# Patient Record
Sex: FEMALE | Race: WHITE | NOT HISPANIC OR LATINO
[De-identification: names, ages, dates, MRNs, and addresses within clinical notes are randomized per-mention and may not be internally consistent; named-entity substitution may affect disease eponyms.]

---

## 2022-05-12 PROBLEM — Z00.00 ENCOUNTER FOR PREVENTIVE HEALTH EXAMINATION: Status: ACTIVE | Noted: 2022-05-12

## 2022-07-11 ENCOUNTER — APPOINTMENT (OUTPATIENT)
Dept: GERIATRICS | Facility: CLINIC | Age: 87
End: 2022-07-11

## 2022-07-11 VITALS
WEIGHT: 122 LBS | SYSTOLIC BLOOD PRESSURE: 110 MMHG | HEART RATE: 97 BPM | BODY MASS INDEX: 23.95 KG/M2 | TEMPERATURE: 97.6 F | HEIGHT: 60 IN | OXYGEN SATURATION: 97 % | DIASTOLIC BLOOD PRESSURE: 64 MMHG

## 2022-07-11 DIAGNOSIS — J30.2 OTHER SEASONAL ALLERGIC RHINITIS: ICD-10-CM

## 2022-07-11 DIAGNOSIS — Z82.49 FAMILY HISTORY OF ISCHEMIC HEART DISEASE AND OTHER DISEASES OF THE CIRCULATORY SYSTEM: ICD-10-CM

## 2022-07-11 DIAGNOSIS — K59.09 OTHER CONSTIPATION: ICD-10-CM

## 2022-07-11 DIAGNOSIS — Z80.1 FAMILY HISTORY OF MALIGNANT NEOPLASM OF TRACHEA, BRONCHUS AND LUNG: ICD-10-CM

## 2022-07-11 PROCEDURE — 99205 OFFICE O/P NEW HI 60 MIN: CPT

## 2022-07-11 RX ORDER — FLUTICASONE PROPIONATE 50 UG/1
50 SPRAY, METERED NASAL DAILY
Qty: 1 | Refills: 9 | Status: ACTIVE | COMMUNITY
Start: 2022-07-11

## 2022-07-11 RX ORDER — ROSUVASTATIN CALCIUM 20 MG/1
20 TABLET, FILM COATED ORAL DAILY
Qty: 90 | Refills: 3 | Status: ACTIVE | COMMUNITY
Start: 2022-07-11

## 2022-07-11 NOTE — SOCIAL HISTORY
[With spouse] : lives with spouse [Assisted living] : in assisted living [FreeTextEntry1] : [facility] in [place] Counseling: Patient advised regarding possible photosensitivity and discoloration of the teeth, skin, lips, tongue and gums.  Patient instructed to avoid sunlight, if possible.  When exposed to sunlight, patients should wear protective clothing, sunglasses, and sunscreen.  The patient was instructed to call the office immediately if the following severe adverse effects occur:  hearing changes, easy bruising/bleeding, severe headache, or vision changes.  The patient verbalized understanding of the proper use and possible adverse effects of minocycline.  All of the patient's questions and concerns were addressed.

## 2022-07-11 NOTE — ASSESSMENT
[FreeTextEntry1] : establishing care\par needs rheumatologist to help manage MTX and adminster prolia\par now no longer needing walker\par off of pain meds\par did have epidurals in past\par \par will obtain records from PCP in [place]

## 2022-07-11 NOTE — PHYSICAL EXAM
[Alert] : alert [Well Nourished] : well nourished [No Acute Distress] : in no acute distress [Well Developed] : well developed [Sclera] : the sclera and conjunctiva were normal [EOMI] : extraocular movements were intact [PERRL] : pupils were equal in size, round, and reactive to light [Normal Oral Mucosa] : normal oral mucosa [No Oral Pallor] : no oral pallor [Normal Appearance] : the appearance of the neck was normal [No Neck Mass] : no neck mass was observed [No Respiratory Distress] : no respiratory distress [No Acc Muscle Use] : no accessory muscle use [Respiration, Rhythm And Depth] : normal respiratory rhythm and effort [Auscultation Breath Sounds / Voice Sounds] : lungs were clear to auscultation bilaterally [Normal PMI] : the apical impulse was abnormal [Normal S1, S2] : normal S1 and S2 [Bowel Sounds] : normal bowel sounds [Abdomen Tenderness] : non-tender [Abdomen Soft] : soft [Cervical Lymph Nodes Enlarged Posterior Bilaterally] : posterior cervical [Supraclavicular Lymph Nodes Enlarged Bilaterally] : supraclavicular [Cervical Lymph Nodes Enlarged Anterior Bilaterally] : anterior cervical, supraclavicular [No CVA Tenderness] : no CVA  tenderness [No Spinal Tenderness] : no spinal tenderness [Normal Color / Pigmentation] : normal skin color and pigmentation [] : no rash [Skin Lesions] : no skin lesions [Normal Affect] : the affect was normal [Normal Mood] : the mood was normal

## 2022-07-11 NOTE — HISTORY OF PRESENT ILLNESS
[Completely Independent] : Completely independent. [Smoke Detector] : smoke detector [Carbon Monoxide Detector] : carbon monoxide detector [Grab Bars] : grab bars [Shower Chair] : shower chair [0] : 2) Feeling down, depressed, or hopeless: Not at all (0) [One fall no injury in past year] : Patient reported one fall in the past year without injury [PHQ-2 Negative - No further assessment needed] : PHQ-2 Negative - No further assessment needed [FreeTextEntry1] : 87 year old female presenting to establish care\par Had been living in [place] 30 years\par Moved to [place] about 9 months ago\par Children are in [place]\par [facility] living [facility]\par \par Fell in [place] and hurt her L shoulder \par She required surgery\par also was later found to have elbow fracture\par \par \par  [[id number]] : 0

## 2022-07-11 NOTE — REVIEW OF SYSTEMS
[Fever] : no fever [Chills] : no chills [Feeling Poorly] : not feeling poorly [Feeling Tired] : not feeling tired [Eyesight Problems] : no eyesight problems [Discharge From Eyes] : no purulent discharge from the eyes [Nosebleeds] : no nosebleeds [Nasal Discharge] : no nasal discharge [Chest Pain] : no chest pain [Palpitations] : no palpitations [Cough] : no cough [SOB on Exertion] : no shortness of breath during exertion [Constipation] : no constipation [Diarrhea] : no diarrhea [Dysuria] : no dysuria [Incontinence] : no incontinence [Itching] : no itching [Change In A Mole] : no change in a mole [Dizziness] : no dizziness [Fainting] : no fainting [Anxiety] : no anxiety [Depression] : no depression

## 2022-08-10 ENCOUNTER — APPOINTMENT (OUTPATIENT)
Dept: RHEUMATOLOGY | Facility: CLINIC | Age: 87
End: 2022-08-10

## 2022-08-10 VITALS
HEIGHT: 60 IN | BODY MASS INDEX: 23.95 KG/M2 | HEART RATE: 73 BPM | WEIGHT: 122 LBS | DIASTOLIC BLOOD PRESSURE: 72 MMHG | OXYGEN SATURATION: 94 % | SYSTOLIC BLOOD PRESSURE: 130 MMHG

## 2022-08-10 PROCEDURE — 99205 OFFICE O/P NEW HI 60 MIN: CPT

## 2022-08-10 NOTE — PHYSICAL EXAM
[General Appearance - Alert] : alert [General Appearance - In No Acute Distress] : in no acute distress [Sclera] : the sclera and conjunctiva were normal [PERRL With Normal Accommodation] : pupils were equal in size, round, and reactive to light [Outer Ear] : the ears and nose were normal in appearance [Neck Appearance] : the appearance of the neck was normal [Auscultation Breath Sounds / Voice Sounds] : lungs were clear to auscultation bilaterally [Heart Rate And Rhythm] : heart rate was normal and rhythm regular [Heart Sounds] : normal S1 and S2 [Edema] : there was no peripheral edema [Cervical Lymph Nodes Enlarged Posterior Bilaterally] : posterior cervical [Cervical Lymph Nodes Enlarged Anterior Bilaterally] : anterior cervical [FreeTextEntry1] : upper: no synovitis noted on exam, no deformities, joint FROM on active and passive movement: Lower FROM, no synovitis noted on exam [] : no rash [No Focal Deficits] : no focal deficits [Oriented To Time, Place, And Person] : oriented to person, place, and time

## 2022-08-10 NOTE — HISTORY OF PRESENT ILLNESS
[FreeTextEntry1] : 86yo F with  PMHx of Rheumatoid arthritis, DJD lumbar spine presented to the office to establish care.\par \par \par RA history:\par 3-4 year ago was diagnosed with rheumatoid arthritis after evaluation of diffuse body aches. started on MTX for this symptoms with some relief achieved. currently on MTX 5 tabs weekly.\par \par \par Osteoporosis: diagnosed with osteoporosis years ago base on DEXA scan findings. on Prolia last dose more that 6 monht ago\par \par Chornic low back pain: hand multiple joint injection on the back. was on treatment with Cymbalta gabapentin and tramadol. but therapy discontinue more than a year ago due to sedation related fall. pain management appointment this friday.\par \par Today reports\par -back pain - wax wane, variable intensity with response to tylenol\par -constipation\par -no significant stiffness in am\par -denied skin rashes\par -denied synovitis of the hands\par -denied dactylitis\par -

## 2022-08-11 LAB
24R-OH-CALCIDIOL SERPL-MCNC: 51.7 PG/ML
ALBUMIN SERPL ELPH-MCNC: 4.7 G/DL
ALP BLD-CCNC: 72 U/L
ALT SERPL-CCNC: 37 U/L
ANION GAP SERPL CALC-SCNC: 12 MMOL/L
AST SERPL-CCNC: 29 U/L
BASOPHILS # BLD AUTO: 0.02 K/UL
BASOPHILS NFR BLD AUTO: 0.4 %
BILIRUB SERPL-MCNC: 0.3 MG/DL
BUN SERPL-MCNC: 22 MG/DL
CALCIUM SERPL-MCNC: 10.2 MG/DL
CHLORIDE SERPL-SCNC: 105 MMOL/L
CO2 SERPL-SCNC: 26 MMOL/L
CREAT SERPL-MCNC: 0.96 MG/DL
CRP SERPL-MCNC: <3 MG/L
EGFR: 57 ML/MIN/1.73M2
EOSINOPHIL # BLD AUTO: 0.07 K/UL
EOSINOPHIL NFR BLD AUTO: 1.4 %
ERYTHROCYTE [SEDIMENTATION RATE] IN BLOOD BY WESTERGREN METHOD: 25 MM/HR
GLUCOSE SERPL-MCNC: 82 MG/DL
HCT VFR BLD CALC: 37.8 %
HGB BLD-MCNC: 11.9 G/DL
IMM GRANULOCYTES NFR BLD AUTO: 0.4 %
LYMPHOCYTES # BLD AUTO: 1.3 K/UL
LYMPHOCYTES NFR BLD AUTO: 26.2 %
MAN DIFF?: NORMAL
MCHC RBC-ENTMCNC: 29.6 PG
MCHC RBC-ENTMCNC: 31.5 GM/DL
MCV RBC AUTO: 94 FL
MONOCYTES # BLD AUTO: 0.56 K/UL
MONOCYTES NFR BLD AUTO: 11.3 %
NEUTROPHILS # BLD AUTO: 2.99 K/UL
NEUTROPHILS NFR BLD AUTO: 60.3 %
PLATELET # BLD AUTO: 170 K/UL
POTASSIUM SERPL-SCNC: 4 MMOL/L
PROT SERPL-MCNC: 6.8 G/DL
RBC # BLD: 4.02 M/UL
RBC # FLD: 16.6 %
RHEUMATOID FACT SER QL: <10 IU/ML
SODIUM SERPL-SCNC: 143 MMOL/L
WBC # FLD AUTO: 4.96 K/UL

## 2022-08-12 ENCOUNTER — APPOINTMENT (OUTPATIENT)
Dept: PAIN MANAGEMENT | Facility: CLINIC | Age: 87
End: 2022-08-12

## 2022-08-12 VITALS
SYSTOLIC BLOOD PRESSURE: 146 MMHG | DIASTOLIC BLOOD PRESSURE: 78 MMHG | BODY MASS INDEX: 23.95 KG/M2 | TEMPERATURE: 98 F | HEIGHT: 60 IN | WEIGHT: 122 LBS

## 2022-08-12 LAB
CCP AB SER IA-ACNC: <8 UNITS
RF+CCP IGG SER-IMP: NEGATIVE

## 2022-08-12 PROCEDURE — 99204 OFFICE O/P NEW MOD 45 MIN: CPT

## 2022-08-12 RX ORDER — METOPROLOL TARTRATE 25 MG/1
25 TABLET, FILM COATED ORAL TWICE DAILY
Refills: 0 | Status: COMPLETED | COMMUNITY
Start: 2022-07-11 | End: 2022-08-12

## 2022-08-12 RX ORDER — DENOSUMAB 60 MG/ML
60 INJECTION SUBCUTANEOUS
Qty: 1 | Refills: 1 | Status: ACTIVE | COMMUNITY
Start: 2022-07-11 | End: 1900-01-01

## 2022-08-12 NOTE — HISTORY OF PRESENT ILLNESS
[4] : 3. What number best describes how, during the past week, pain has interfered with your general activity? 4/10 pain [Back Pain] : back pain [___ yrs] : [unfilled] year(s) ago [Constant] : constant [5] : a minimum pain level of 5/10 [6] : a maximum pain level of 6/10 [Dull] : dull [Aching] : aching [Burning] : burning [Medications] : medications [FreeTextEntry1] : HPI\par \par  \par Ms. LALIT MCKENNA is a 87 year F with pmhx osteoporosis (was on Prolia), HTN, RA c/o chronic right sided LBP that radiates posteriorly to knee. hx of falls and memory issues  resolved since stopping Tramadol, Cymbalta, and Celebrex. Most recent fall 2021 and hospitalization for broken ribs, elbow, and broken shoulder. Numbness to the bottom of right foot in am the resolved on rising. Denies any additional weakness, numbness, bowel/bladder dysfunction.\par \par Also c/o right thumb pain that began a few months ago. Exacerbated more lately by knitting. \par \par \par Previous and current pain medications/doses/effects: Tylenol \par \par Tramadol, Cymbalta, Celebrex (stopped 6 months ago all dues to dizziness)\par \par \par Previous Pain Treatments: Physical therapy x 3 months\par \par \par Previous Pain Injections: Dr Aguirre ([place])- 2021\par \par  \par Previous Diagnostic Studies/Images:\par \par na\par \par  \par \par \par  [FreeTextEntry2] : 14 [FreeTextEntry7] : Lower back pain , radiating down right leg, also pain in right thumb  [FreeTextEntry3] : n/a [FreeTextEntry4] : PT

## 2022-08-12 NOTE — REVIEW OF SYSTEMS
[Back Pain] : back pain [Muscle Pain] : muscle pain [Radiating Pain] : radiating pain [Joint Pain] : joint pain [Numbness] : numbness [Negative] : Heme/Lymph

## 2022-08-12 NOTE — ASSESSMENT
[FreeTextEntry1] : >> Imaging and Other Studies\par \par back and leg pain likely secondary to lumbar radiculopathy and discogenic pain refractory to conservative treatments including 6 consecutive weeks of home exercises/PT, will obtain MRI LS to evaluate for pathology\par \par may consider PT vs intervention pending eval\par \par >> Therapy and Other Modalities\par \par >> Medications\par  \par Regarding opiate medication to manage pain. I had a detailed discussion with the patient regarding the risks of long-term opioid use, including the potential for medication side effects, hyperaglesia, endocrine dysfunction,  Encouraged weaning with assistance of current prescriber.\par \par acetaminophen 650mg q8h prn pain (caution <3g daily)\par \par >> Interventions\par \par may consider intervention\par \par >> Consults\par \par evaluate right thumb pain - ortho referral\par \par >> Discussion of Risks/Benefits/Alternatives\par \par 	>Regarding any scheduled procedures:\par \par I have discussed in detail with the patient that any interventional pain procedure is associated with potential risks.  The procedure may include an injection of steroids and potentially other medications (local anesthetic and normal saline) into the epidural space or surrounding tissue of the spine.  There are significant risks of this procedure which include and are not limited to infection, bleeding, worsening pain, dural puncture leading to postdural puncture headache, nerve damage, spinal cord injury, paralysis, stroke, and death.  \par \par There is a chance that the procedure does not improve their pain.  \par \par There are risks associated with the steroid being absorbed into the body systemically.  These include dysphoria, difficulty sleeping, mood swings and personality changes.  Premenopausal women may notice an irregularity in her menstrual cycle for 2-3 months following the injection.  Steroids can specifically affect patients with hypertension, diabetes, and peptic ulcers.  The procedure may cause a temporary increase in blood pressure and blood pressure, and may adversely affect a peptic ulcer.  Other, more rare complications, include avascular necrosis of joints, glaucoma and worsening of osteoporosis. \par \par I have discussed the risks of the procedure at length with the patient, and the potential benefits of pain relief.  I have offered alternatives to the procedure.  All questions were answered.  \par \par The patient expressed understanding and wishes to proceed with the procedure.\par \par 	>Regarding COVID19 Pandemic: \par \par Any planned interventional pain procedure are scheduled because further delay may cause harm or negative outcome to patient.  The goal in performing this procedure is to avoid deterioration of function, emergency room visits (which increases exposure) and reliance on opioids.  \par \par r/b/a discussed with patient, lack of evidence to conclusively determine whether pain management procedures have any positive or negative impact on the possibility of noreen the virus and/or development of any sequelae. \par \par Patient counselled regarding timing steroid based intervention 2 weeks before or after COVID-19 vaccine administration to avoid any interaction or affect on efficacy of vaccination\par \par Patient demonstrates understanding\par \par Informed patient that risks associated with the COVID-19 infection.  Informed patient steps taken to limit the risks.  We are implementing safety precautions and following protocols consistent with the CDC and state recommendations. All patients and staff will be checked for fever or signs of illness upon entry to the facility. We will limit our steroid dose to the lowest effective therapeutic dose or in some cases steroids will not be injected at all. \par \par Patient agrees to proceed\par \par >> Conclusion\par \par The above diagnosis and treatment plan is medically reasonable and necessary based on the patient encounter \par There were no barriers to communication.\par Informed patient that I would be available for any additional questions.\par Patient was instructed to call with any worsening symptoms including severe pain, new numbness/weakness, or changes in the bowel/bladder function. \par Discussed role of nsaids in pain management and all relevant risks, if patient is continuing to require after 4 weeks the patient should f/u for alternative treatment. \par Instructed patient to maintain pain diary to monitor pain level, mobility, and function.\par \par The referring provider was informed of the above diagnosis and treatment plan.\par

## 2022-08-12 NOTE — PHYSICAL EXAM
[Facet Tenderness] : facet tenderness [Spine: Flexion to ___ degrees, without pain] : spine: flexion to [unfilled] degrees, without pain [Normal] : Normal affect [de-identified] : Constitutional: Normal, well developed, no acute distress\par Eyes: Symmetric, External structures \par Oropharynx: Lips normal, symmetric, no external lesions appreciated\par Respiratory: Non-labored breathing, no audible wheezes\par Cardiac: Pulse palpated, no tachycardia\par Vascular: No cyanosis appreciated, no edema in bilateral lower extremities\par GI: Nondistended, no jaundice appreciated\par Neurovascular: CN2-12 grossly intact, Alert and oriented\par MSK: Normal muscle bulk, 5/5 Motor strength B/L in LE\par \par

## 2022-08-12 NOTE — REASON FOR VISIT
[Initial Visit] : an initial pain management visit [FreeTextEntry2] : Referred by Dr. Gagnon for back pain

## 2022-08-20 ENCOUNTER — RESULT REVIEW (OUTPATIENT)
Age: 87
End: 2022-08-20

## 2022-09-02 ENCOUNTER — APPOINTMENT (OUTPATIENT)
Dept: RHEUMATOLOGY | Facility: CLINIC | Age: 87
End: 2022-09-02

## 2022-09-02 VITALS
DIASTOLIC BLOOD PRESSURE: 60 MMHG | HEIGHT: 60 IN | OXYGEN SATURATION: 98 % | BODY MASS INDEX: 24.15 KG/M2 | SYSTOLIC BLOOD PRESSURE: 138 MMHG | HEART RATE: 62 BPM | WEIGHT: 123 LBS

## 2022-09-02 PROCEDURE — 99214 OFFICE O/P EST MOD 30 MIN: CPT

## 2022-09-02 NOTE — END OF VISIT
[Time Spent: ___ minutes] : I have spent [unfilled] minutes of time on the encounter.
Patient tolerated procedure well.

## 2022-09-02 NOTE — HISTORY OF PRESENT ILLNESS
[___ Month(s) Ago] : [unfilled] month(s) ago [FreeTextEntry1] : RA: no evidence of active inflammatory arthritis. back pain unlikely to be a manifestation of RA\par Osteoporosis: last lab results, osteopenia. prolia approved to receive it in the office\par pain evaluation completed. MRI lumbar spine with DJD

## 2022-09-15 ENCOUNTER — LABORATORY RESULT (OUTPATIENT)
Age: 87
End: 2022-09-15

## 2022-09-21 ENCOUNTER — APPOINTMENT (OUTPATIENT)
Dept: PAIN MANAGEMENT | Facility: CLINIC | Age: 87
End: 2022-09-21

## 2022-09-21 VITALS
BODY MASS INDEX: 24.15 KG/M2 | DIASTOLIC BLOOD PRESSURE: 78 MMHG | HEIGHT: 60 IN | SYSTOLIC BLOOD PRESSURE: 149 MMHG | TEMPERATURE: 98 F | WEIGHT: 123 LBS

## 2022-09-21 PROCEDURE — 99214 OFFICE O/P EST MOD 30 MIN: CPT

## 2022-09-30 ENCOUNTER — APPOINTMENT (OUTPATIENT)
Dept: PAIN MANAGEMENT | Facility: CLINIC | Age: 87
End: 2022-09-30

## 2022-09-30 VITALS
BODY MASS INDEX: 24.54 KG/M2 | RESPIRATION RATE: 16 BRPM | TEMPERATURE: 98 F | HEIGHT: 60 IN | SYSTOLIC BLOOD PRESSURE: 137 MMHG | OXYGEN SATURATION: 100 % | WEIGHT: 125 LBS | DIASTOLIC BLOOD PRESSURE: 80 MMHG | HEART RATE: 57 BPM

## 2022-09-30 PROCEDURE — 64494 INJ PARAVERT F JNT L/S 2 LEV: CPT | Mod: 50

## 2022-09-30 PROCEDURE — 64493 INJ PARAVERT F JNT L/S 1 LEV: CPT | Mod: 50

## 2022-09-30 RX ADMIN — TRIAMCINOLONE ACETONIDE MG/ML: 10 INJECTION, SUSPENSION INTRA-ARTICULAR; INTRALESIONAL at 00:00

## 2022-09-30 RX ADMIN — BUPIVACAINE HYDROCHLORIDE %: 2.5 INJECTION, SOLUTION INFILTRATION; PERINEURAL at 00:00

## 2022-09-30 NOTE — HISTORY OF PRESENT ILLNESS
[3] : 3. What number best describes how, during the past week, pain has interfered with your general activity? 3/10 pain [Back Pain] : back pain [___ yrs] : [unfilled] year(s) ago [Constant] : constant [5] : a minimum pain level of 5/10 [6] : a maximum pain level of 6/10 [Dull] : dull [Aching] : aching [Burning] : burning [Medications] : medications [FreeTextEntry1] : Interval Note:\par \par Since last visit the pain is not improved.  Patient reports that her pain is worse with transition.  Also complains of pain over right mid back and pain over bilateral hands.  Seeing Dr. Pandey (rheum). Denies any additional weakness, numbness, bowel/bladder dysfunction.  denies any worsening numbness, weakness, bowel/bladder dysfunction. \par \par \par HPI\par \par  \par Ms. LALIT MCKENNA is a 87 year F with pmhx osteoporosis (was on Prolia), HTN, RA c/o chronic right sided LBP that radiates posteriorly to knee. hx of falls and memory issues  resolved since stopping Tramadol, Cymbalta, and Celebrex. Most recent fall 2021 and hospitalization for broken ribs, elbow, and broken shoulder. Numbness to the bottom of right foot in am the resolved on rising. Denies any additional weakness, numbness, bowel/bladder dysfunction.\par \par Also c/o right thumb pain that began a few months ago. Exacerbated more lately by knitting. \par \par \par Previous and current pain medications/doses/effects: Tylenol \par \par Tramadol, Cymbalta, Celebrex (stopped 6 months ago all dues to dizziness)\par \par \par Previous Pain Treatments: Physical therapy x 3 months\par \par \par Previous Pain Injections: Dr Aguirre ([place])- 2021\par \par  \par Previous Diagnostic Studies/Images:\par \par MRI  LS 8/22\par \par There is grade 1 anterolisthesis of L5-S1. There is straightening of the normal lumbar lordosis.\par There is S-shaped curvature of the lumbar spine. The vertebral body heights are maintained. The\par vertebral body marrow signal is appropriate for the patient's stated age.\par \par  The conus medullaris ends at L1.\par \par  L1/2 level: Small broad-based posterior disc bulge and mild facet arthropathy without central canal\par and neural foraminal narrowing.\par \par  L2/3  level: Broad-based posterior disc bulge and moderate bilateral facet arthropathy result in\par mild bilateral neural foraminal narrowing and moderate central canal narrowing. There is impingement\par of the bilateral descending C2slopr roots within the right and left lateral recesses.\par \par  L3/4 level: Broad-based posterior disc bulge and moderate bilateral facet arthropathy result in\par severe central canal narrowing, mild left neural foraminal narrowing and moderate right neural charan\par inal narrowing. There is impingement of the descending right L4 nerve root within the right lateral\par recess.\par \par  L4/5 level: Broad-based posterior disc bulge and moderate bilateral facet arthropathy (right\par greater than left) result in mild central canal narrowing, moderate to severe right neural foraminal\par narrowing and moderate left neural foraminal narrowing. There is impingement of the descending right\par L5 nerve roots within the lateral recess.\par \par  L5/S1 level: Grade 1 anterolisthesis. Broad-based posterior disc bulge and severe bilateral facet\par arthropathy (right greater than left) result in moderate central canal narrowing, moderate right\par neural foraminal narrowing and mild left neural foraminal narrowing. 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 There is also moderate central canal narrowing\par at L5-S1 in addition to moderate right neural foraminal narrowing and mild left neural foraminal\par narrowing. Additional varying degrees of central canal and neural foraminal narrowing, as above.\par \par \par \par  \par \par \par  [FreeTextEntry2] : 9 [FreeTextEntry7] : Lower back pain , radiating down right leg, also pain in right thumb  [FreeTextEntry3] : n/a [FreeTextEntry4] : PT

## 2022-09-30 NOTE — ASSESSMENT
[FreeTextEntry1] : >> Imaging and Other Studies\par \par I personally reviewed the relevant imaging.  Discussed and explained to patient the likely source of pathology and pain.  Questions answered. MRI \par \par >> Therapy and Other Modalities\par \par >> Medications\par  \par acetaminophen 650mg q8h prn pain (caution <3g daily)\par \par >> Interventions\par \par Significant component of axial back pain secondary to lumbar spondylosis and facet arthropathy demonstrated on MRI LS.  Pain refractory to conservative treatments.  Will schedule BILATERAL L4-sacral ala diagnostic medial branch block (2 joints, 3 nerves on each side) r/b/a discussed\par \par May consider radiofreqency ablation\par \par >> Consults\par \par evaluate right thumb pain - ortho referral\par \par >> Discussion of Risks/Benefits/Alternatives\par \par 	>Regarding any scheduled procedures:\par \par I have discussed in detail with the patient that any interventional pain procedure is associated with potential risks.  The procedure may include an injection of steroids and potentially other medications (local anesthetic and normal saline) into the epidural space or surrounding tissue of the spine.  There are significant risks of this procedure which include and are not limited to infection, bleeding, worsening pain, dural puncture leading to postdural puncture headache, nerve damage, spinal cord injury, paralysis, stroke, and death.  \par \par There is a chance that the procedure does not improve their pain.  \par \par There are risks associated with the steroid being absorbed into the body systemically.  These include dysphoria, difficulty sleeping, mood swings and personality changes.  Premenopausal women may notice an irregularity in her menstrual cycle for 2-3 months following the injection.  Steroids can specifically affect patients with hypertension, diabetes, and peptic ulcers.  The procedure may cause a temporary increase in blood pressure and blood pressure, and may adversely affect a peptic ulcer.  Other, more rare complications, include avascular necrosis of joints, glaucoma and worsening of osteoporosis. \par \par I have discussed the risks of the procedure at length with the patient, and the potential benefits of pain relief.  I have offered alternatives to the procedure.  All questions were answered.  \par \par The patient expressed understanding and wishes to proceed with the procedure.\par \par 	>Regarding COVID19 Pandemic: \par \par Any planned interventional pain procedure are scheduled because further delay may cause harm or negative outcome to patient.  The goal in performing this procedure is to avoid deterioration of function, emergency room visits (which increases exposure) and reliance on opioids.  \par \par r/b/a discussed with patient, lack of evidence to conclusively determine whether pain management procedures have any positive or negative impact on the possibility of noreen the virus and/or development of any sequelae. \par \par Patient counselled regarding timing steroid based intervention 2 weeks before or after COVID-19 vaccine administration to avoid any interaction or affect on efficacy of vaccination\par \par Patient demonstrates understanding\par \par Informed patient that risks associated with the COVID-19 infection.  Informed patient steps taken to limit the risks.  We are implementing safety precautions and following protocols consistent with the CDC and state recommendations. All patients and staff will be checked for fever or signs of illness upon entry to the facility. We will limit our steroid dose to the lowest effective therapeutic dose or in some cases steroids will not be injected at all. \par \par Patient agrees to proceed\par \par >> Conclusion\par \par The above diagnosis and treatment plan is medically reasonable and necessary based on the patient encounter \par There were no barriers to communication.\par Informed patient that I would be available for any additional questions.\par Patient was instructed to call with any worsening symptoms including severe pain, new numbness/weakness, or changes in the bowel/bladder function. \par Discussed role of nsaids in pain management and all relevant risks, if patient is continuing to require after 4 weeks the patient should f/u for alternative treatment. \par Instructed patient to maintain pain diary to monitor pain level, mobility, and function.\par \par

## 2022-10-04 ENCOUNTER — APPOINTMENT (OUTPATIENT)
Dept: PAIN MANAGEMENT | Facility: CLINIC | Age: 87
End: 2022-10-04

## 2022-10-04 PROCEDURE — 99214 OFFICE O/P EST MOD 30 MIN: CPT | Mod: 95

## 2022-10-06 NOTE — HISTORY OF PRESENT ILLNESS
[Home] : at home, [unfilled] , at the time of the visit. [Medical Office: ([facility])___] : at the medical office located in  [Verbal consent obtained from patient] : the patient, [unfilled] [Back Pain] : back pain [___ yrs] : [unfilled] year(s) ago [Constant] : constant [5] : a minimum pain level of 5/10 [6] : a maximum pain level of 6/10 [Dull] : dull [Aching] : aching [Burning] : burning [Medications] : medications [FreeTextEntry1] : Interval Note:\par sp  BILATERAL L4-sacral ala diagnostic medial branch block (2 joints, 3 nerves on each side) 9/30/22 with sustained improvement in axial back pain 80%.  Patient reports that "ordinarily, I would have taken a tylenol or two by now.\par   denies any worsening numbness, weakness, bowel/bladder dysfunction. \par \par \par HPI\par \par  \par Ms. LALIT MCKENNA is a 88 year F with pmhx osteoporosis (was on Prolia), HTN, RA c/o chronic right sided LBP that radiates posteriorly to knee. hx of falls and memory issues  resolved since stopping Tramadol, Cymbalta, and Celebrex. Most recent fall 2021 and hospitalization for broken ribs, elbow, and broken shoulder. Numbness to the bottom of right foot in am the resolved on rising. Denies any additional weakness, numbness, bowel/bladder dysfunction.\par \par Also c/o right thumb pain that began a few months ago. Exacerbated more lately by knitting. \par \par \par Previous and current pain medications/doses/effects: Tylenol \par \par Tramadol, Cymbalta, Celebrex (stopped 6 months ago all dues to dizziness)\par \par \par Previous Pain Treatments: Physical therapy x 3 months\par \par \par Previous Pain Injections: \par BILATERAL L4-sacral ala diagnostic medial branch block (2 joints, 3 nerves on each side) 9/30/22\par Dr Aguirre ([place])- 2021\par  \par Previous Diagnostic Studies/Images:\par \par MRI  LS 8/22\par \par There is grade 1 anterolisthesis of L5-S1. There is straightening of the normal lumbar lordosis.\par There is S-shaped curvature of the lumbar spine. The vertebral body heights are maintained. The\par vertebral body marrow signal is appropriate for the patient's stated age.\par \par  The conus medullaris ends at L1.\par \par  L1/2 level: Small broad-based posterior disc bulge and mild facet arthropathy without central canal\par and neural foraminal narrowing.\par \par  L2/3  level: Broad-based posterior disc bulge and moderate bilateral facet arthropathy result in\par mild bilateral neural foraminal narrowing and moderate central canal narrowing. There is impingement\par of the bilateral descending [id number] roots within the right and left lateral recesses.\par \par  L3/4 level: Broad-based posterior disc bulge and moderate bilateral facet arthropathy result in\par severe central canal narrowing, mild left neural foraminal narrowing and moderate right neural charan\par inal narrowing. There is impingement of the descending right L4 nerve root within the right lateral\par recess.\par \par  L4/5 level: Broad-based posterior disc bulge and moderate bilateral facet arthropathy (right\par greater than left) result in mild central canal narrowing, moderate to severe right neural foraminal\par narrowing and moderate left neural foraminal narrowing. There is impingement of the descending right\par L5 nerve roots within the lateral recess.\par \par  L5/S1 level: Grade 1 anterolisthesis. Broad-based posterior disc bulge and severe bilateral facet\par arthropathy (right greater than left) result in moderate central canal narrowing, moderate right\par neural foraminal narrowing and mild left neural foraminal narrowing. 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 There is also moderate central canal narrowing\par at L5-S1 in addition to moderate right neural foraminal narrowing and mild left neural foraminal\par narrowing. Additional varying degrees of central canal and neural foraminal narrowing, as above.\par \par \par \par  \par \par \par  [FreeTextEntry7] : Lower back pain , radiating down right leg, also pain in right thumb  [FreeTextEntry3] : n/a [FreeTextEntry4] : PT

## 2022-10-06 NOTE — ASSESSMENT
[FreeTextEntry1] : >> Imaging and Other Studies\par \par I personally reviewed the relevant imaging.  Discussed and explained to patient the likely source of pathology and pain.  Questions answered. MRI \par \par >> Therapy and Other Modalities\par \par >> Medications\par  \par acetaminophen 650mg q8h prn pain (caution <3g daily)\par \par >> Interventions\par \par Significant component of axial back pain secondary to lumbar spondylosis and facet arthropathy demonstrated on MRI LS.  Pain refractory to conservative treatments. sp BILATERAL L4-sacral ala diagnostic medial branch block (2 joints, 3 nerves on each side) with 80% sustained relief of axial back pain\par \par May consider radiofreqency ablation\par \par >> Consults\par \par evaluate right thumb pain - ortho referral\par \par >> Discussion of Risks/Benefits/Alternatives\par \par 	>Regarding any scheduled procedures:\par \par I have discussed in detail with the patient that any interventional pain procedure is associated with potential risks.  The procedure may include an injection of steroids and potentially other medications (local anesthetic and normal saline) into the epidural space or surrounding tissue of the spine.  There are significant risks of this procedure which include and are not limited to infection, bleeding, worsening pain, dural puncture leading to postdural puncture headache, nerve damage, spinal cord injury, paralysis, stroke, and death.  \par \par There is a chance that the procedure does not improve their pain.  \par \par There are risks associated with the steroid being absorbed into the body systemically.  These include dysphoria, difficulty sleeping, mood swings and personality changes.  Premenopausal women may notice an irregularity in her menstrual cycle for 2-3 months following the injection.  Steroids can specifically affect patients with hypertension, diabetes, and peptic ulcers.  The procedure may cause a temporary increase in blood pressure and blood pressure, and may adversely affect a peptic ulcer.  Other, more rare complications, include avascular necrosis of joints, glaucoma and worsening of osteoporosis. \par \par I have discussed the risks of the procedure at length with the patient, and the potential benefits of pain relief.  I have offered alternatives to the procedure.  All questions were answered.  \par \par The patient expressed understanding and wishes to proceed with the procedure.\par \par 	>Regarding COVID19 Pandemic: \par \par Any planned interventional pain procedure are scheduled because further delay may cause harm or negative outcome to patient.  The goal in performing this procedure is to avoid deterioration of function, emergency room visits (which increases exposure) and reliance on opioids.  \par \par r/b/a discussed with patient, lack of evidence to conclusively determine whether pain management procedures have any positive or negative impact on the possibility of noreen the virus and/or development of any sequelae. \par \par Patient counselled regarding timing steroid based intervention 2 weeks before or after COVID-19 vaccine administration to avoid any interaction or affect on efficacy of vaccination\par \par Patient demonstrates understanding\par \par Informed patient that risks associated with the COVID-19 infection.  Informed patient steps taken to limit the risks.  We are implementing safety precautions and following protocols consistent with the CDC and state recommendations. All patients and staff will be checked for fever or signs of illness upon entry to the facility. We will limit our steroid dose to the lowest effective therapeutic dose or in some cases steroids will not be injected at all. \par \par Patient agrees to proceed\par \par >> Conclusion\par \par The above diagnosis and treatment plan is medically reasonable and necessary based on the patient encounter \par There were no barriers to communication.\par Informed patient that I would be available for any additional questions.\par Patient was instructed to call with any worsening symptoms including severe pain, new numbness/weakness, or changes in the bowel/bladder function. \par Discussed role of nsaids in pain management and all relevant risks, if patient is continuing to require after 4 weeks the patient should f/u for alternative treatment. \par Instructed patient to maintain pain diary to monitor pain level, mobility, and function.\par \par I explained to patient benefits and limitation of TeleMedicine visits\par \par Patient understands that limitations include inability to perform comprehensive physical exam, which may lead to potential diagnostic inconsistencies.  \par \par Any scheduled procedures are based on history, imaging and limited physical exam performed on TeleHealth visit.  If necessary, additional focal physical exam will be performed on date of procedure\par \par Patient understands that diagnosis and treatment may be limited by these inconsistencies and patient agrees to proceed with care plan\par \par \par

## 2022-10-19 ENCOUNTER — APPOINTMENT (OUTPATIENT)
Dept: PAIN MANAGEMENT | Facility: CLINIC | Age: 87
End: 2022-10-19

## 2022-10-19 VITALS
DIASTOLIC BLOOD PRESSURE: 78 MMHG | HEIGHT: 60 IN | OXYGEN SATURATION: 99 % | HEART RATE: 64 BPM | BODY MASS INDEX: 24.54 KG/M2 | WEIGHT: 125 LBS | SYSTOLIC BLOOD PRESSURE: 137 MMHG | RESPIRATION RATE: 16 BRPM

## 2022-10-19 PROCEDURE — 64493 INJ PARAVERT F JNT L/S 1 LEV: CPT | Mod: 50

## 2022-10-19 PROCEDURE — 64494 INJ PARAVERT F JNT L/S 2 LEV: CPT | Mod: 50

## 2022-10-19 RX ADMIN — TRIAMCINOLONE ACETONIDE MG/ML: 10 INJECTION, SUSPENSION INTRA-ARTICULAR; INTRALESIONAL at 00:00

## 2022-10-19 RX ADMIN — Medication %: at 00:00

## 2022-10-19 RX ADMIN — BUPIVACAINE HYDROCHLORIDE 0 %: 2.5 INJECTION, SOLUTION EPIDURAL; INFILTRATION; INTRACAUDAL at 00:00

## 2022-10-20 ENCOUNTER — APPOINTMENT (OUTPATIENT)
Dept: PAIN MANAGEMENT | Facility: CLINIC | Age: 87
End: 2022-10-20

## 2022-10-20 VITALS
DIASTOLIC BLOOD PRESSURE: 77 MMHG | WEIGHT: 125 LBS | BODY MASS INDEX: 24.54 KG/M2 | SYSTOLIC BLOOD PRESSURE: 142 MMHG | HEIGHT: 60 IN | TEMPERATURE: 98 F

## 2022-10-20 PROCEDURE — 99214 OFFICE O/P EST MOD 30 MIN: CPT

## 2022-10-20 NOTE — ASSESSMENT
[FreeTextEntry1] : >> Imaging and Other Studies\par \par I personally reviewed the relevant imaging.  Discussed and explained to patient the likely source of pathology and pain.  Questions answered. MRI \par \par >> Therapy and Other Modalities\par \par start PT - referral provided\par \par >> Medications\par  \par acetaminophen 650mg q8h prn pain (caution <3g daily)\par \par >> Interventions\par \par Significant component of axial back pain secondary to lumbar spondylosis and facet arthropathy demonstrated on MRI LS.  Pain refractory to conservative treatments. sp BILATERAL L4-sacral ala diagnostic medial branch block (2 joints, 3 nerves on each side) with 80% sustained relief of axial back pain\par \par sp repeat BILATERAL L4-sacral ala diagnostic medial branch block (2 joints, 3 nerves on each side) with 100% sustained improvement\par \par May consider radiofreqency ablation\par \par >> Consults\par \par evaluate right thumb pain - ortho referral\par \par >> Discussion of Risks/Benefits/Alternatives\par \par 	>Regarding any scheduled procedures:\par \par I have discussed in detail with the patient that any interventional pain procedure is associated with potential risks.  The procedure may include an injection of steroids and potentially other medications (local anesthetic and normal saline) into the epidural space or surrounding tissue of the spine.  There are significant risks of this procedure which include and are not limited to infection, bleeding, worsening pain, dural puncture leading to postdural puncture headache, nerve damage, spinal cord injury, paralysis, stroke, and death.  \par \par There is a chance that the procedure does not improve their pain.  \par \par There are risks associated with the steroid being absorbed into the body systemically.  These include dysphoria, difficulty sleeping, mood swings and personality changes.  Premenopausal women may notice an irregularity in her menstrual cycle for 2-3 months following the injection.  Steroids can specifically affect patients with hypertension, diabetes, and peptic ulcers.  The procedure may cause a temporary increase in blood pressure and blood pressure, and may adversely affect a peptic ulcer.  Other, more rare complications, include avascular necrosis of joints, glaucoma and worsening of osteoporosis. \par \par I have discussed the risks of the procedure at length with the patient, and the potential benefits of pain relief.  I have offered alternatives to the procedure.  All questions were answered.  \par \par The patient expressed understanding and wishes to proceed with the procedure.\par \par 	>Regarding COVID19 Pandemic: \par \par Any planned interventional pain procedure are scheduled because further delay may cause harm or negative outcome to patient.  The goal in performing this procedure is to avoid deterioration of function, emergency room visits (which increases exposure) and reliance on opioids.  \par \par r/b/a discussed with patient, lack of evidence to conclusively determine whether pain management procedures have any positive or negative impact on the possibility of noreen the virus and/or development of any sequelae. \par \par Patient counselled regarding timing steroid based intervention 2 weeks before or after COVID-19 vaccine administration to avoid any interaction or affect on efficacy of vaccination\par \par Patient demonstrates understanding\par \par Informed patient that risks associated with the COVID-19 infection.  Informed patient steps taken to limit the risks.  We are implementing safety precautions and following protocols consistent with the CDC and state recommendations. All patients and staff will be checked for fever or signs of illness upon entry to the facility. We will limit our steroid dose to the lowest effective therapeutic dose or in some cases steroids will not be injected at all. \par \par Patient agrees to proceed\par \par >> Conclusion\par \par The above diagnosis and treatment plan is medically reasonable and necessary based on the patient encounter \par There were no barriers to communication.\par Informed patient that I would be available for any additional questions.\par Patient was instructed to call with any worsening symptoms including severe pain, new numbness/weakness, or changes in the bowel/bladder function. \par Discussed role of nsaids in pain management and all relevant risks, if patient is continuing to require after 4 weeks the patient should f/u for alternative treatment. \par Instructed patient to maintain pain diary to monitor pain level, mobility, and function.\par \par I explained to patient benefits and limitation of TeleMedicine visits\par \par Patient understands that limitations include inability to perform comprehensive physical exam, which may lead to potential diagnostic inconsistencies.  \par \par Any scheduled procedures are based on history, imaging and limited physical exam performed on TeleHealth visit.  If necessary, additional focal physical exam will be performed on date of procedure\par \par Patient understands that diagnosis and treatment may be limited by these inconsistencies and patient agrees to proceed with care plan\par \par \par

## 2022-10-20 NOTE — HISTORY OF PRESENT ILLNESS
[0 (No Pain)] : 1. What number best describes your pain on average in the past week? 0/10 pain [0 (Does not interfere)] : 3. What number best describes how, during the past week, pain has interfered with your general activity? 0/10 pain [Back Pain] : back pain [___ yrs] : [unfilled] year(s) ago [Constant] : constant [5] : a minimum pain level of 5/10 [6] : a maximum pain level of 6/10 [Dull] : dull [Aching] : aching [Burning] : burning [Medications] : medications [FreeTextEntry1] : Interval Note:\par \par sp  BILATERAL L4-sacral ala diagnostic medial branch block (2 joints, 3 nerves on each side) 9/30/22 with sustained improvement in axial back pain 100% improvement.  Patient is doing well and very comfortable. \par   denies any worsening numbness, weakness, bowel/bladder dysfunction. \par \par \par HPI\par \par  \par Ms. LALIT MCKENNA is a 88 year F with pmhx osteoporosis (was on Prolia), HTN, RA c/o chronic right sided LBP that radiates posteriorly to knee. hx of falls and memory issues  resolved since stopping Tramadol, Cymbalta, and Celebrex. Most recent fall 2021 and hospitalization for broken ribs, elbow, and broken shoulder. Numbness to the bottom of right foot in am the resolved on rising. Denies any additional weakness, numbness, bowel/bladder dysfunction.\par \par Also c/o right thumb pain that began a few months ago. Exacerbated more lately by knitting. \par \par \par Previous and current pain medications/doses/effects: Tylenol \par \par Tramadol, Cymbalta, Celebrex (stopped 6 months ago all dues to dizziness)\par \par \par Previous Pain Treatments: Physical therapy x 3 months\par \par \par Previous Pain Injections: \par \par BILATERAL L4-sacral ala diagnostic medial branch block (2 joints, 3 nerves on each side) 10/19/22\par BILATERAL L4-sacral ala diagnostic medial branch block (2 joints, 3 nerves on each side) 9/30/22\par Dr Aguirre ([place])- 2021\par  \par Previous Diagnostic Studies/Images:\par \par MRI  LS 8/22\par \par There is grade 1 anterolisthesis of L5-S1. There is straightening of the normal lumbar lordosis.\par There is S-shaped curvature of the lumbar spine. The vertebral body heights are maintained. The\par vertebral body marrow signal is appropriate for the patient's stated age.\par \par  The conus medullaris ends at L1.\par \par  L1/2 level: Small broad-based posterior disc bulge and mild facet arthropathy without central canal\par and neural foraminal narrowing.\par \par  L2/3  level: Broad-based posterior disc bulge and moderate bilateral facet arthropathy result in\par mild bilateral neural foraminal narrowing and moderate central canal narrowing. There is impingement\par of the bilateral descending [id number] roots within the right and left lateral recesses.\par \par  L3/4 level: Broad-based posterior disc bulge and moderate bilateral facet arthropathy result in\par severe central canal narrowing, mild left neural foraminal narrowing and moderate right neural charan\par inal narrowing. There is impingement of the descending right L4 nerve root within the right lateral\par recess.\par \par  L4/5 level: Broad-based posterior disc bulge and moderate bilateral facet arthropathy (right\par greater than left) result in mild central canal narrowing, moderate to severe right neural foraminal\par narrowing and moderate left neural foraminal narrowing. There is impingement of the descending right\par L5 nerve roots within the lateral recess.\par \par  L5/S1 level: Grade 1 anterolisthesis. Broad-based posterior disc bulge and severe bilateral facet\par arthropathy (right greater than left) result in moderate central canal narrowing, moderate right\par neural foraminal narrowing and mild left neural foraminal narrowing. 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 There is also moderate central canal narrowing\par at L5-S1 in addition to moderate right neural foraminal narrowing and mild left neural foraminal\par narrowing. Additional varying degrees of central canal and neural foraminal narrowing, as above.\par \par \par \par  \par \par \par  [FreeTextEntry2] : 0 [FreeTextEntry7] : Lower back pain , radiating down right leg, also pain in right thumb  [FreeTextEntry3] : n/a [FreeTextEntry4] : PT

## 2022-10-21 ENCOUNTER — RX RENEWAL (OUTPATIENT)
Age: 87
End: 2022-10-21

## 2022-11-15 ENCOUNTER — LABORATORY RESULT (OUTPATIENT)
Age: 87
End: 2022-11-15

## 2022-11-18 ENCOUNTER — APPOINTMENT (OUTPATIENT)
Dept: PAIN MANAGEMENT | Facility: CLINIC | Age: 87
End: 2022-11-18

## 2022-11-18 VITALS
WEIGHT: 125 LBS | BODY MASS INDEX: 24.54 KG/M2 | HEIGHT: 60 IN | DIASTOLIC BLOOD PRESSURE: 88 MMHG | TEMPERATURE: 98 F | SYSTOLIC BLOOD PRESSURE: 157 MMHG

## 2022-11-18 PROCEDURE — 99214 OFFICE O/P EST MOD 30 MIN: CPT

## 2022-11-18 NOTE — HISTORY OF PRESENT ILLNESS
[3] : 3. What number best describes how, during the past week, pain has interfered with your general activity? 3/10 pain [Back Pain] : back pain [___ yrs] : [unfilled] year(s) ago [Constant] : constant [5] : a minimum pain level of 5/10 [6] : a maximum pain level of 6/10 [Dull] : dull [Aching] : aching [Burning] : burning [Medications] : medications [FreeTextEntry1] : Interval Note:\par \par sp  BILATERAL L4-sacral ala diagnostic medial branch block (2 joints, 3 nerves on each side) with sustained improvement in axial back pain 100% improvement.  \par \par She complains today of neck pain radiating to right shoulder as well as right thumb pain and left forearm pain, reports that it affects ability to perform adls and grab objects at times. \par   denies any worsening numbness, weakness, bowel/bladder dysfunction. \par \par \par HPI\par \par  \par Ms. LALIT MCKENNA is a 88 year F with pmhx osteoporosis (was on Prolia), HTN, RA c/o chronic right sided LBP that radiates posteriorly to knee. hx of falls and memory issues  resolved since stopping Tramadol, Cymbalta, and Celebrex. Most recent fall 2021 and hospitalization for broken ribs, elbow, and broken shoulder. Numbness to the bottom of right foot in am the resolved on rising. Denies any additional weakness, numbness, bowel/bladder dysfunction.\par \par Also c/o right thumb pain that began a few months ago. Exacerbated more lately by knitting. \par \par \par Previous and current pain medications/doses/effects: Tylenol \par \par Tramadol, Cymbalta, Celebrex (stopped 6 months ago all dues to dizziness)\par \par \par Previous Pain Treatments: Physical therapy x 3 months\par \par \par Previous Pain Injections: \par \par BILATERAL L4-sacral ala diagnostic medial branch block (2 joints, 3 nerves on each side) 10/19/22\par BILATERAL L4-sacral ala diagnostic medial branch block (2 joints, 3 nerves on each side) 9/30/22\par Dr Aguirre ([place])- 2021\par  \par Previous Diagnostic Studies/Images:\par \par MRI  LS 8/22\par \par There is grade 1 anterolisthesis of L5-S1. There is straightening of the normal lumbar lordosis.\par There is S-shaped curvature of the lumbar spine. The vertebral body heights are maintained. The\par vertebral body marrow signal is appropriate for the patient's stated age.\par \par  The conus medullaris ends at L1.\par \par  L1/2 level: Small broad-based posterior disc bulge and mild facet arthropathy without central canal\par and neural foraminal narrowing.\par \par  L2/3  level: Broad-based posterior disc bulge and moderate bilateral facet arthropathy result in\par mild bilateral neural foraminal narrowing and moderate central canal narrowing. There is impingement\par of the bilateral descending [id number] roots within the right and left lateral recesses.\par \par  L3/4 level: Broad-based posterior disc bulge and moderate bilateral facet arthropathy result in\par severe central canal narrowing, mild left neural foraminal narrowing and moderate right neural charan\par inal narrowing. There is impingement of the descending right L4 nerve root within the right lateral\par recess.\par \par  L4/5 level: Broad-based posterior disc bulge and moderate bilateral facet arthropathy (right\par greater than left) result in mild central canal narrowing, moderate to severe right neural foraminal\par narrowing and moderate left neural foraminal narrowing. There is impingement of the descending right\par L5 nerve roots within the lateral recess.\par \par  L5/S1 level: Grade 1 anterolisthesis. Broad-based posterior disc bulge and severe bilateral facet\par arthropathy (right greater than left) result in moderate central canal narrowing, moderate right\par neural foraminal narrowing and mild left neural foraminal narrowing. 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 There is also moderate central canal narrowing\par at L5-S1 in addition to moderate right neural foraminal narrowing and mild left neural foraminal\par narrowing. Additional varying degrees of central canal and neural foraminal narrowing, as above.\par \par \par \par  \par \par \par  [FreeTextEntry2] : 9 [FreeTextEntry7] : Lower back pain , radiating down right leg, also pain in right thumb  [FreeTextEntry3] : n/a [FreeTextEntry4] : PT

## 2022-11-18 NOTE — PHYSICAL EXAM
[Normal muscle bulk without asymmetry] : normal muscle bulk without asymmetry [Facet Tenderness] : no facet tenderness [Spurling] : positive Spurling's Test [Normal] : Normal affect

## 2022-11-18 NOTE — ASSESSMENT
[FreeTextEntry1] : >> Imaging and Other Studies\par \par I personally reviewed the relevant imaging.  Discussed and explained to patient the likely source of pathology and pain.  Questions answered. MRI \par \par Neck and arm pain likely secondary to cervical radiculopathy and discogenic pain vs spondylosis refractory to conservative treatments including 6 consecutive weeks of PT/home exercises, will obtain MRI CS to evaluate for pathology\par \par may consider PT vs intervention pending eval\par \par \par >> Therapy and Other Modalities\par \par continue PT\par \par TENS trial\par \par >> Medications\par  \par acetaminophen 650mg q8h prn pain (caution <3g daily)\par \par >> Interventions\par \par Significant component of axial back pain secondary to lumbar spondylosis and facet arthropathy demonstrated on MRI LS.  Pain refractory to conservative treatments. sp BILATERAL L4-sacral ala diagnostic medial branch block (2 joints, 3 nerves on each side) with 80% sustained relief of axial back pain\par \par sp repeat BILATERAL L4-sacral ala diagnostic medial branch block (2 joints, 3 nerves on each side) with 100% sustained improvement\par \par May consider radiofreqency ablation\par \par >> Consults\par \par evaluate right thumb pain - ortho referral\par \par >> Discussion of Risks/Benefits/Alternatives\par \par 	>Regarding any scheduled procedures:\par \par I have discussed in detail with the patient that any interventional pain procedure is associated with potential risks.  The procedure may include an injection of steroids and potentially other medications (local anesthetic and normal saline) into the epidural space or surrounding tissue of the spine.  There are significant risks of this procedure which include and are not limited to infection, bleeding, worsening pain, dural puncture leading to postdural puncture headache, nerve damage, spinal cord injury, paralysis, stroke, and death.  \par \par There is a chance that the procedure does not improve their pain.  \par \par There are risks associated with the steroid being absorbed into the body systemically.  These include dysphoria, difficulty sleeping, mood swings and personality changes.  Premenopausal women may notice an irregularity in her menstrual cycle for 2-3 months following the injection.  Steroids can specifically affect patients with hypertension, diabetes, and peptic ulcers.  The procedure may cause a temporary increase in blood pressure and blood pressure, and may adversely affect a peptic ulcer.  Other, more rare complications, include avascular necrosis of joints, glaucoma and worsening of osteoporosis. \par \par I have discussed the risks of the procedure at length with the patient, and the potential benefits of pain relief.  I have offered alternatives to the procedure.  All questions were answered.  \par \par The patient expressed understanding and wishes to proceed with the procedure.\par \par 	>Regarding COVID19 Pandemic: \par \par Any planned interventional pain procedure are scheduled because further delay may cause harm or negative outcome to patient.  The goal in performing this procedure is to avoid deterioration of function, emergency room visits (which increases exposure) and reliance on opioids.  \par \par r/b/a discussed with patient, lack of evidence to conclusively determine whether pain management procedures have any positive or negative impact on the possibility of noreen the virus and/or development of any sequelae. \par \par Patient counselled regarding timing steroid based intervention 2 weeks before or after COVID-19 vaccine administration to avoid any interaction or affect on efficacy of vaccination\par \par Patient demonstrates understanding\par \par Informed patient that risks associated with the COVID-19 infection.  Informed patient steps taken to limit the risks.  We are implementing safety precautions and following protocols consistent with the CDC and state recommendations. All patients and staff will be checked for fever or signs of illness upon entry to the facility. We will limit our steroid dose to the lowest effective therapeutic dose or in some cases steroids will not be injected at all. \par \par Patient agrees to proceed\par \par >> Conclusion\par \par The above diagnosis and treatment plan is medically reasonable and necessary based on the patient encounter \par There were no barriers to communication.\par Informed patient that I would be available for any additional questions.\par Patient was instructed to call with any worsening symptoms including severe pain, new numbness/weakness, or changes in the bowel/bladder function. \par Discussed role of nsaids in pain management and all relevant risks, if patient is continuing to require after 4 weeks the patient should f/u for alternative treatment. \par Instructed patient to maintain pain diary to monitor pain level, mobility, and function.\par \par \par \par \par

## 2022-11-28 ENCOUNTER — APPOINTMENT (OUTPATIENT)
Dept: GERIATRICS | Facility: CLINIC | Age: 87
End: 2022-11-28

## 2022-11-28 ENCOUNTER — NON-APPOINTMENT (OUTPATIENT)
Age: 87
End: 2022-11-28

## 2022-11-28 VITALS
DIASTOLIC BLOOD PRESSURE: 76 MMHG | OXYGEN SATURATION: 97 % | BODY MASS INDEX: 24.61 KG/M2 | SYSTOLIC BLOOD PRESSURE: 118 MMHG | HEART RATE: 58 BPM | TEMPERATURE: 97.9 F | WEIGHT: 126 LBS

## 2022-11-28 PROCEDURE — G0009: CPT

## 2022-11-28 PROCEDURE — G0439: CPT

## 2022-11-28 PROCEDURE — 93000 ELECTROCARDIOGRAM COMPLETE: CPT

## 2022-11-28 PROCEDURE — 90677 PCV20 VACCINE IM: CPT

## 2022-11-28 RX ORDER — ERGOCALCIFEROL 1.25 MG/1
1.25 MG CAPSULE, LIQUID FILLED ORAL
Qty: 8 | Refills: 0 | Status: COMPLETED | COMMUNITY
Start: 2022-07-11 | End: 2022-11-28

## 2022-11-28 NOTE — HISTORY OF PRESENT ILLNESS
[3] : 3 [Fully Independent] : fully independent [Does not drive] : does not drive [No history of falls] : no history of falls [Smoke Detectors] : smoke detectors [Bathroom Grab Bars] : bathroom grab bars [No falls in past year] : Patient reported no falls in the past year [Completely Independent] : Completely independent. [Smoke Detector] : smoke detector [Carbon Monoxide Detector] : carbon monoxide detector [Grab Bars] : grab bars [Shower Chair] : shower chair [0] : 2) Feeling down, depressed, or hopeless: Not at all (0) [PMH Reviewed and Updated] : past medical history reviewed and updated [PSH Reviewed and Updated] : past surgical history reviewed and updated [Family History Reviewed and Updated] : family history reviewed and updated [Medication and Allergies Reconciled] : medication and allergies reconciled [Spouse] : spouse [Retired] : retired from work [None] : The patient has no concerns about alcohol abuse [Never] : has never used illicit drugs [Compliant with medications] : compliant with medications [Seatbelts] : seatbelts [PHQ-2 Negative - No further assessment needed] : PHQ-2 Negative - No further assessment needed [Over the Past 2 Weeks, Have You Felt Down, Depressed, or Hopeless?] : 1.) Over the past 2 weeks, have you felt down, depressed, or hopeless? No [Over the Past 2 Weeks, Have You Felt Little Interest or Pleasure Doing Things?] : 2.) Over the past 2 weeks, have you felt little interest or pleasure doing things? 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

## 2022-11-28 NOTE — ASSESSMENT
[FreeTextEntry1] : reviewed labs\par TFTs normalized\par ESR now normal\par goal is to avoid tramadol, gabapentin due to falls\par consider ablation\par having more OAB\par on myrbetriq\par has had botox in past\par start OTC 1000 daily\par check levels in 3 months\par \par

## 2022-11-28 NOTE — PHYSICAL EXAM
[Alert] : alert [Well Nourished] : well nourished [Well Developed] : well developed [Sclera] : the sclera and conjunctiva were normal [EOMI] : extraocular movements were intact [PERRL] : pupils were equal in size, round, and reactive to light [Normal Oral Mucosa] : normal oral mucosa [No Oral Pallor] : no oral pallor [No Neck Mass] : no neck mass was observed [No Respiratory Distress] : no respiratory distress [No Acc Muscle Use] : no accessory muscle use [Respiration, Rhythm And Depth] : normal respiratory rhythm and effort [Auscultation Breath Sounds / Voice Sounds] : lungs were clear to auscultation bilaterally [Normal PMI] : the apical impulse was abnormal [Normal S1, S2] : normal S1 and S2 [Normal Appearance] : normal in appearance [Breast Palpation Mass] : no palpable masses [No Nipple Discharge] : no nipple discharge [Bowel Sounds] : normal bowel sounds [Abdomen Tenderness] : non-tender [Abdomen Soft] : soft [Cervical Lymph Nodes Enlarged Posterior Bilaterally] : posterior cervical [Supraclavicular Lymph Nodes Enlarged Bilaterally] : supraclavicular [Cervical Lymph Nodes Enlarged Anterior Bilaterally] : anterior cervical, supraclavicular [No CVA Tenderness] : no CVA  tenderness [No Spinal Tenderness] : no spinal tenderness [Normal Color / Pigmentation] : normal skin color and pigmentation [] : no rash [Skin Lesions] : no skin lesions [Normal Affect] : the affect was normal [Normal Mood] : the mood was normal

## 2022-12-09 ENCOUNTER — RESULT REVIEW (OUTPATIENT)
Age: 87
End: 2022-12-09

## 2023-01-26 ENCOUNTER — APPOINTMENT (OUTPATIENT)
Dept: RHEUMATOLOGY | Facility: CLINIC | Age: 88
End: 2023-01-26
Payer: MEDICARE

## 2023-01-26 VITALS
OXYGEN SATURATION: 99 % | DIASTOLIC BLOOD PRESSURE: 70 MMHG | BODY MASS INDEX: 24.54 KG/M2 | SYSTOLIC BLOOD PRESSURE: 144 MMHG | HEART RATE: 69 BPM | WEIGHT: 125 LBS | HEIGHT: 60 IN

## 2023-01-26 PROCEDURE — 99214 OFFICE O/P EST MOD 30 MIN: CPT

## 2023-01-26 NOTE — HISTORY OF PRESENT ILLNESS
[___ Month(s) Ago] : [unfilled] month(s) ago [FreeTextEntry1] : RA: no evidence of active inflammatory arthritis. back pain unlikely to be a manifestation of RA\par Osteoporosis: last lab results, osteopenia. prolia q6 months\par today feeling better after nerve block\par possible candidate for radioablation for chronic back pain of msk etiology.

## 2023-01-30 LAB
ALBUMIN SERPL ELPH-MCNC: 4.5 G/DL
ALP BLD-CCNC: 50 U/L
ALT SERPL-CCNC: 15 U/L
ANION GAP SERPL CALC-SCNC: 12 MMOL/L
AST SERPL-CCNC: 20 U/L
BASOPHILS # BLD AUTO: 0.04 K/UL
BASOPHILS NFR BLD AUTO: 0.7 %
BILIRUB SERPL-MCNC: 0.2 MG/DL
BUN SERPL-MCNC: 23 MG/DL
CALCIUM SERPL-MCNC: 9.6 MG/DL
CHLORIDE SERPL-SCNC: 108 MMOL/L
CO2 SERPL-SCNC: 26 MMOL/L
CREAT SERPL-MCNC: 0.88 MG/DL
CRP SERPL-MCNC: <3 MG/L
EGFR: 63 ML/MIN/1.73M2
EOSINOPHIL # BLD AUTO: 0.09 K/UL
EOSINOPHIL NFR BLD AUTO: 1.5 %
ERYTHROCYTE [SEDIMENTATION RATE] IN BLOOD BY WESTERGREN METHOD: 28 MM/HR
GLUCOSE SERPL-MCNC: 83 MG/DL
HCT VFR BLD CALC: 38.6 %
HGB BLD-MCNC: 12.2 G/DL
IMM GRANULOCYTES NFR BLD AUTO: 0.3 %
LYMPHOCYTES # BLD AUTO: 1.56 K/UL
LYMPHOCYTES NFR BLD AUTO: 26.4 %
MAN DIFF?: NORMAL
MCHC RBC-ENTMCNC: 31.4 PG
MCHC RBC-ENTMCNC: 31.6 GM/DL
MCV RBC AUTO: 99.2 FL
MONOCYTES # BLD AUTO: 0.63 K/UL
MONOCYTES NFR BLD AUTO: 10.7 %
NEUTROPHILS # BLD AUTO: 3.56 K/UL
NEUTROPHILS NFR BLD AUTO: 60.4 %
PLATELET # BLD AUTO: 152 K/UL
POTASSIUM SERPL-SCNC: 4.8 MMOL/L
PROT SERPL-MCNC: 7.1 G/DL
RBC # BLD: 3.89 M/UL
RBC # FLD: 14.1 %
SODIUM SERPL-SCNC: 146 MMOL/L
WBC # FLD AUTO: 5.9 K/UL

## 2023-03-07 ENCOUNTER — APPOINTMENT (OUTPATIENT)
Dept: GERIATRICS | Facility: CLINIC | Age: 88
End: 2023-03-07
Payer: MEDICARE

## 2023-03-07 VITALS
WEIGHT: 122 LBS | DIASTOLIC BLOOD PRESSURE: 66 MMHG | SYSTOLIC BLOOD PRESSURE: 124 MMHG | HEART RATE: 61 BPM | OXYGEN SATURATION: 100 % | BODY MASS INDEX: 23.95 KG/M2 | TEMPERATURE: 96.3 F | HEIGHT: 60 IN

## 2023-03-07 DIAGNOSIS — Z23 ENCOUNTER FOR IMMUNIZATION: ICD-10-CM

## 2023-03-07 PROCEDURE — 90715 TDAP VACCINE 7 YRS/> IM: CPT | Mod: GY

## 2023-03-07 PROCEDURE — 36415 COLL VENOUS BLD VENIPUNCTURE: CPT

## 2023-03-07 PROCEDURE — 90471 IMMUNIZATION ADMIN: CPT

## 2023-03-07 PROCEDURE — 99215 OFFICE O/P EST HI 40 MIN: CPT | Mod: 25

## 2023-03-08 NOTE — HISTORY OF PRESENT ILLNESS
[No falls in past year] : Patient reported no falls in the past year [Completely Independent] : Completely independent. [Walker] : walker [Smoke Detector] : smoke detector [0] : 2) Feeling down, depressed, or hopeless: Not at all (0) [PHQ-2 Negative - No further assessment needed] : PHQ-2 Negative - No further assessment needed [FreeTextEntry1] : 88 year old female presenting for follow up with \par Had been living in [place] 30 years\par Moved to [place] ago\[place]\[facility]\par \par Fell in [place] and hurt her L shoulder \par She required surgery\par also was later found to have elbow fracture\par \par considering ablation\par more urinary incontinence\par having weak nails \par \par \par  [[id number]] : 0

## 2023-03-08 NOTE — ASSESSMENT
[FreeTextEntry1] : refer to urogynecolgy\par Dr Shea Roberts\par Tdap today \par start biotin daily

## 2023-04-12 ENCOUNTER — APPOINTMENT (OUTPATIENT)
Dept: PAIN MANAGEMENT | Facility: CLINIC | Age: 88
End: 2023-04-12
Payer: MEDICARE

## 2023-04-12 VITALS
WEIGHT: 122 LBS | BODY MASS INDEX: 23.95 KG/M2 | DIASTOLIC BLOOD PRESSURE: 70 MMHG | HEIGHT: 60 IN | SYSTOLIC BLOOD PRESSURE: 128 MMHG

## 2023-04-12 PROCEDURE — 99214 OFFICE O/P EST MOD 30 MIN: CPT

## 2023-04-12 NOTE — ASSESSMENT
[FreeTextEntry1] : >> Imaging and Other Studies\par \par I personally reviewed the relevant imaging.  Discussed and explained to patient the likely source of pathology and pain.  Questions answered. MRI \par \par may consider PT vs intervention pending eval\par \par \par >> Therapy and Other Modalities\par \par continue PT\par \par TENS trial\par \par >> Medications\par  \par acetaminophen 650mg q8h prn pain (caution <3g daily)\par \par >> Interventions\par \par Significant component of axial back pain secondary to lumbar spondylosis and facet arthropathy demonstrated on MRI LS.  Pain refractory to conservative treatments. sp BILATERAL L4-sacral ala diagnostic medial branch block (2 joints, 3 nerves on each side) with 80% sustained relief of axial back pain\par \par sp repeat BILATERAL L4-sacral ala diagnostic medial branch block (2 joints, 3 nerves on each side) with 100% sustained improvement\par \par Given significant relief from diagnostic lumbar medial branch block of >80%, and significant improvement in function (as measured by MITCHELL) and continued lumbar facet arthropathy pain (demonstrated on imaging) and axial back pain, will schedule LEFT AND RIGHT  L4-sacral ala medial branch (2 joints, 3 nerves on each side) radiofrequency ablation at 80C for 2:30 min r/b/a discussed\par \par >> Consults\par \par evaluate right thumb pain - ortho referral\par \par >> Discussion of Risks/Benefits/Alternatives\par \par 	>Regarding any scheduled procedures:\par \par I have discussed in detail with the patient that any interventional pain procedure is associated with potential risks.  The procedure may include an injection of steroids and potentially other medications (local anesthetic and normal saline) into the epidural space or surrounding tissue of the spine.  There are significant risks of this procedure which include and are not limited to infection, bleeding, worsening pain, dural puncture leading to postdural puncture headache, nerve damage, spinal cord injury, paralysis, stroke, and death.  \par \par There is a chance that the procedure does not improve their pain.  \par \par There are risks associated with the steroid being absorbed into the body systemically.  These include dysphoria, difficulty sleeping, mood swings and personality changes.  Premenopausal women may notice an irregularity in her menstrual cycle for 2-3 months following the injection.  Steroids can specifically affect patients with hypertension, diabetes, and peptic ulcers.  The procedure may cause a temporary increase in blood pressure and blood pressure, and may adversely affect a peptic ulcer.  Other, more rare complications, include avascular necrosis of joints, glaucoma and worsening of osteoporosis. \par \par I have discussed the risks of the procedure at length with the patient, and the potential benefits of pain relief.  I have offered alternatives to the procedure.  All questions were answered.  \par \par The patient expressed understanding and wishes to proceed with the procedure.\par \par 	>Regarding COVID19 Pandemic: \par \par Any planned interventional pain procedure are scheduled because further delay may cause harm or negative outcome to patient.  The goal in performing this procedure is to avoid deterioration of function, emergency room visits (which increases exposure) and reliance on opioids.  \par \par r/b/a discussed with patient, lack of evidence to conclusively determine whether pain management procedures have any positive or negative impact on the possibility of noreen the virus and/or development of any sequelae. \par \par Patient counselled regarding timing steroid based intervention 2 weeks before or after COVID-19 vaccine administration to avoid any interaction or affect on efficacy of vaccination\par \par Patient demonstrates understanding\par \par Informed patient that risks associated with the COVID-19 infection.  Informed patient steps taken to limit the risks.  We are implementing safety precautions and following protocols consistent with the CDC and state recommendations. All patients and staff will be checked for fever or signs of illness upon entry to the facility. We will limit our steroid dose to the lowest effective therapeutic dose or in some cases steroids will not be injected at all. \par \par Patient agrees to proceed\par \par >> Conclusion\par \par The above diagnosis and treatment plan is medically reasonable and necessary based on the patient encounter \par There were no barriers to communication.\par Informed patient that I would be available for any additional questions.\par Patient was instructed to call with any worsening symptoms including severe pain, new numbness/weakness, or changes in the bowel/bladder function. \par Discussed role of nsaids in pain management and all relevant risks, if patient is continuing to require after 4 weeks the patient should f/u for alternative treatment. \par Instructed patient to maintain pain diary to monitor pain level, mobility, and function.\par \par \par \par \par

## 2023-04-12 NOTE — HISTORY OF PRESENT ILLNESS
[3] : 3. What number best describes how, during the past week, pain has interfered with your general activity? 3/10 pain [Back Pain] : back pain [___ yrs] : [unfilled] year(s) ago [Constant] : constant [5] : a minimum pain level of 5/10 [6] : a maximum pain level of 6/10 [Dull] : dull [Aching] : aching [Burning] : burning [Medications] : medications [FreeTextEntry1] : Interval Note:\par \par sp  BILATERAL L4-sacral ala diagnostic medial branch block (2 joints, 3 nerves on each side) with sustained improvement in axial back pain 100% improvement for a week, but reports that axial back pain has returned.  Worse with transition.  Pain is so bad that patient finds it difficult to perform adls and ambulate.  \par  denies any worsening numbness, weakness, bowel/bladder dysfunction. \par \par \par HPI\par \par  \par Ms. LALIT MCKENNA is a 88 year F with pmhx osteoporosis (was on Prolia), HTN, RA c/o chronic right sided LBP that radiates posteriorly to knee. hx of falls and memory issues  resolved since stopping Tramadol, Cymbalta, and Celebrex. Most recent fall 2021 and hospitalization for broken ribs, elbow, and broken shoulder. Numbness to the bottom of right foot in am the resolved on rising. Denies any additional weakness, numbness, bowel/bladder dysfunction.\par \par Also c/o right thumb pain that began a few months ago. Exacerbated more lately by knitting. \par \par \par Previous and current pain medications/doses/effects: Tylenol \par \par Tramadol, Cymbalta, Celebrex (stopped 6 months ago all dues to dizziness)\par \par \par Previous Pain Treatments: Physical therapy x 3 months\par \par \par Previous Pain Injections: \par \par BILATERAL L4-sacral ala diagnostic medial branch block (2 joints, 3 nerves on each side) 10/19/22\par BILATERAL L4-sacral ala diagnostic medial branch block (2 joints, 3 nerves on each side) 9/30/22\par Dr Aguirre ([place])- 2021\par  \par Previous Diagnostic Studies/Images:\par \par MRI CS 4/23\par \par Cervical lordosis maintained. Cervical vertebral body heights are maintained. Mild C3-C4\par anterolisthesis.. Cervical vertebral body bone marrow signal within normal limits. There are\par multilevel degenerative endplate changes with osteophyte formation, intervertebral disc space\par narrowing/desiccation, Schmorl's nodes and endplate irregularity. . No suspicious expansile or\par destructive osseous lesion identified. Paraspinal soft tissues are within normal limits. Visualized\par lung apices are unremarkable. Visualized vertebral artery flow voids are preserved. No abnormal cord\par signal identified. No significant periarticular or paraspinal soft tissue edema identified.\par \par  There are multilevel findings as follows:\par \par \par \par  Craniocervical junction unremarkable.\par \par  C2-C3: No significant canal or foraminal stenosis.\par \par  C3-C4: Posterior osteophytic ridging with disc bulging and bilateral uncovertebral/facet\par hypertrophy contributing to no significant canal stenosis and mild bilateral foraminal stenosis.\par \par  C4-C5: Posterior osteophytic ridging with disc bulging and bilateral uncovertebral/facet\par hypertrophy contributing to no significant canal stenosis and moderate bilateral foraminal stenosis.\par \par \par  C5-C6: Posterior osteophytic ridging with disc bulging and bilateral uncovertebral/facet\par hypertrophy contributing to no significant canal stenosis and moderate to severe bilateral foraminal\par stenosis.\par \par \par  C6-C7: Posterior osteophytic ridging with disc bulging and bilateral uncovertebral/facet\par hypertrophy contributing to no significant canal stenosis and moderate bilateral foraminal stenosis.\par \par  C7-T1: No significant canal or foraminal stenosis.\par \par \par \par  IMPRESSION:\par \par  Multilevel cervical spondylitic changes as detailed above most notable for moderate to severe\par bilateral foraminal stenosis at C4-C5, C5-C6 and C6-C7. No significant canal stenosis of the\par cervical spine.\par \par \par MRI  LS 8/22\par \par There is grade 1 anterolisthesis of L5-S1. There is straightening of the normal lumbar lordosis.\par There is S-shaped curvature of the lumbar spine. The vertebral body heights are maintained. The\par vertebral body marrow signal is appropriate for the patient's stated age.\par \par  The conus medullaris ends at L1.\par \par  L1/2 level: Small broad-based posterior disc bulge and mild facet arthropathy without central canal\par and neural foraminal narrowing.\par \par  L2/3  level: Broad-based posterior disc bulge and moderate bilateral facet arthropathy result in\par mild bilateral neural foraminal narrowing and moderate central canal narrowing. There is impingement\par of the bilateral descending [id number] roots within the right and left lateral recesses.\par \par  L3/4 level: Broad-based posterior disc bulge and moderate bilateral facet arthropathy result in\par severe central canal narrowing, mild left neural foraminal narrowing and moderate right neural charan\par inal narrowing. There is impingement of the descending right L4 nerve root within the right lateral\par recess.\par \par  L4/5 level: Broad-based posterior disc bulge and moderate bilateral facet arthropathy (right\par greater than left) result in mild central canal narrowing, moderate to severe right neural foraminal\par narrowing and moderate left neural foraminal narrowing. There is impingement of the descending right\par L5 nerve roots within the lateral recess.\par \par  L5/S1 level: Grade 1 anterolisthesis. Broad-based posterior disc bulge and severe bilateral facet\par arthropathy (right greater than left) result in moderate central canal narrowing, moderate right\par neural foraminal narrowing and mild left neural foraminal narrowing. 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 There is also moderate central canal narrowing\par at L5-S1 in addition to moderate right neural foraminal narrowing and mild left neural foraminal\par narrowing. Additional varying degrees of central canal and neural foraminal narrowing, as above.\par \par \par \par  \par \par \par  [FreeTextEntry2] : 9 [FreeTextEntry7] : Lower back pain , radiating down right leg, also pain in right thumb  [FreeTextEntry3] : n/a [FreeTextEntry4] : PT

## 2023-05-03 ENCOUNTER — APPOINTMENT (OUTPATIENT)
Dept: RHEUMATOLOGY | Facility: CLINIC | Age: 88
End: 2023-05-03

## 2023-05-09 ENCOUNTER — APPOINTMENT (OUTPATIENT)
Dept: PAIN MANAGEMENT | Facility: HOSPITAL | Age: 88
End: 2023-05-09

## 2023-05-24 ENCOUNTER — APPOINTMENT (OUTPATIENT)
Dept: PAIN MANAGEMENT | Facility: HOSPITAL | Age: 88
End: 2023-05-24

## 2023-05-26 ENCOUNTER — APPOINTMENT (OUTPATIENT)
Dept: PAIN MANAGEMENT | Facility: CLINIC | Age: 88
End: 2023-05-26
Payer: MEDICARE

## 2023-05-26 VITALS
SYSTOLIC BLOOD PRESSURE: 159 MMHG | WEIGHT: 122 LBS | BODY MASS INDEX: 23.95 KG/M2 | DIASTOLIC BLOOD PRESSURE: 73 MMHG | HEIGHT: 60 IN

## 2023-05-26 PROCEDURE — 99214 OFFICE O/P EST MOD 30 MIN: CPT

## 2023-05-26 NOTE — HISTORY OF PRESENT ILLNESS
[3] : 3. What number best describes how, during the past week, pain has interfered with your general activity? 3/10 pain [Back Pain] : back pain [___ yrs] : [unfilled] year(s) ago [Constant] : constant [5] : a minimum pain level of 5/10 [6] : a maximum pain level of 6/10 [Dull] : dull [Aching] : aching [Burning] : burning [Medications] : medications [FreeTextEntry1] : Interval Note:\par \par sp  BILATERAL L4-sacral ala diagnostic medial branch block (2 joints, 3 nerves on each side) with sustained improvement in axial back pain 100% improvement for a week, but reports that axial back pain has returned.  Worse with transition.  Pain is so bad that patient finds it difficult to perform adls and ambulate.  \par  denies any worsening numbness, weakness, bowel/bladder dysfunction. \par \par \par HPI\par \par  \par Ms. LALIT MCKENNA is a 88 year F with pmhx osteoporosis (was on Prolia), HTN, RA c/o chronic right sided LBP that radiates posteriorly to knee. hx of falls and memory issues  resolved since stopping Tramadol, Cymbalta, and Celebrex. Most recent fall 2021 and hospitalization for broken ribs, elbow, and broken shoulder. Numbness to the bottom of right foot in am the resolved on rising. Denies any additional weakness, numbness, bowel/bladder dysfunction.\par \par Also c/o right thumb pain that began a few months ago. Exacerbated more lately by knitting. \par \par \par Previous and current pain medications/doses/effects: Tylenol \par \par Tramadol, Cymbalta, Celebrex (stopped 6 months ago all dues to dizziness)\par \par \par Previous Pain Treatments: Physical therapy x 3 months\par \par \par Previous Pain Injections: \par \par BILATERAL L4-sacral ala diagnostic medial branch block (2 joints, 3 nerves on each side) 10/19/22\par BILATERAL L4-sacral ala diagnostic medial branch block (2 joints, 3 nerves on each side) 9/30/22\par Dr Aguirre ([place])- 2021\par  \par Previous Diagnostic Studies/Images:\par \par MRI CS 4/23\par \par Cervical lordosis maintained. Cervical vertebral body heights are maintained. Mild C3-C4\par anterolisthesis.. Cervical vertebral body bone marrow signal within normal limits. There are\par multilevel degenerative endplate changes with osteophyte formation, intervertebral disc space\par narrowing/desiccation, Schmorl's nodes and endplate irregularity. . No suspicious expansile or\par destructive osseous lesion identified. Paraspinal soft tissues are within normal limits. Visualized\par lung apices are unremarkable. Visualized vertebral artery flow voids are preserved. No abnormal cord\par signal identified. No significant periarticular or paraspinal soft tissue edema identified.\par \par  There are multilevel findings as follows:\par \par \par \par  Craniocervical junction unremarkable.\par \par  C2-C3: No significant canal or foraminal stenosis.\par \par  C3-C4: Posterior osteophytic ridging with disc bulging and bilateral uncovertebral/facet\par hypertrophy contributing to no significant canal stenosis and mild bilateral foraminal stenosis.\par \par  C4-C5: Posterior osteophytic ridging with disc bulging and bilateral uncovertebral/facet\par hypertrophy contributing to no significant canal stenosis and moderate bilateral foraminal stenosis.\par \par \par  C5-C6: Posterior osteophytic ridging with disc bulging and bilateral uncovertebral/facet\par hypertrophy contributing to no significant canal stenosis and moderate to severe bilateral foraminal\par stenosis.\par \par \par  C6-C7: Posterior osteophytic ridging with disc bulging and bilateral uncovertebral/facet\par hypertrophy contributing to no significant canal stenosis and moderate bilateral foraminal stenosis.\par \par  C7-T1: No significant canal or foraminal stenosis.\par \par \par \par  IMPRESSION:\par \par  Multilevel cervical spondylitic changes as detailed above most notable for moderate to severe\par bilateral foraminal stenosis at C4-C5, C5-C6 and C6-C7. No significant canal stenosis of the\par cervical spine.\par \par \par MRI  LS 8/22\par \par There is grade 1 anterolisthesis of L5-S1. There is straightening of the normal lumbar lordosis.\par There is S-shaped curvature of the lumbar spine. The vertebral body heights are maintained. The\par vertebral body marrow signal is appropriate for the patient's stated age.\par \par  The conus medullaris ends at L1.\par \par  L1/2 level: Small broad-based posterior disc bulge and mild facet arthropathy without central canal\par and neural foraminal narrowing.\par \par  L2/3  level: Broad-based posterior disc bulge and moderate bilateral facet arthropathy result in\par mild bilateral neural foraminal narrowing and moderate central canal narrowing. There is impingement\par of the bilateral descending [id number] roots within the right and left lateral recesses.\par \par  L3/4 level: Broad-based posterior disc bulge and moderate bilateral facet arthropathy result in\par severe central canal narrowing, mild left neural foraminal narrowing and moderate right neural charan\par inal narrowing. There is impingement of the descending right L4 nerve root within the right lateral\par recess.\par \par  L4/5 level: Broad-based posterior disc bulge and moderate bilateral facet arthropathy (right\par greater than left) result in mild central canal narrowing, moderate to severe right neural foraminal\par narrowing and moderate left neural foraminal narrowing. There is impingement of the descending right\par L5 nerve roots within the lateral recess.\par \par  L5/S1 level: Grade 1 anterolisthesis. Broad-based posterior disc bulge and severe bilateral facet\par arthropathy (right greater than left) result in moderate central canal narrowing, moderate right\par neural foraminal narrowing and mild left neural foraminal narrowing. 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 There is also moderate central canal narrowing\par at L5-S1 in addition to moderate right neural foraminal narrowing and mild left neural foraminal\par narrowing. Additional varying degrees of central canal and neural foraminal narrowing, as above.\par \par \par \par  \par \par \par  [FreeTextEntry2] : 9 [FreeTextEntry7] : Lower back pain , radiating down right leg, also pain in right thumb  [FreeTextEntry3] : n/a [FreeTextEntry4] : PT

## 2023-06-01 ENCOUNTER — TRANSCRIPTION ENCOUNTER (OUTPATIENT)
Age: 88
End: 2023-06-01

## 2023-06-01 ENCOUNTER — APPOINTMENT (OUTPATIENT)
Dept: PAIN MANAGEMENT | Facility: HOSPITAL | Age: 88
End: 2023-06-01

## 2023-06-05 ENCOUNTER — APPOINTMENT (OUTPATIENT)
Dept: GERIATRICS | Facility: CLINIC | Age: 88
End: 2023-06-05
Payer: MEDICARE

## 2023-06-05 ENCOUNTER — LABORATORY RESULT (OUTPATIENT)
Age: 88
End: 2023-06-05

## 2023-06-05 VITALS
HEART RATE: 57 BPM | BODY MASS INDEX: 24.61 KG/M2 | WEIGHT: 126 LBS | SYSTOLIC BLOOD PRESSURE: 152 MMHG | TEMPERATURE: 97.3 F | DIASTOLIC BLOOD PRESSURE: 68 MMHG | OXYGEN SATURATION: 100 %

## 2023-06-05 DIAGNOSIS — R35.0 FREQUENCY OF MICTURITION: ICD-10-CM

## 2023-06-05 PROCEDURE — 99213 OFFICE O/P EST LOW 20 MIN: CPT

## 2023-06-05 PROCEDURE — ZZZZZ: CPT

## 2023-06-05 NOTE — HISTORY OF PRESENT ILLNESS
[FreeTextEntry8] : June 1st had gone for lumbar ablation, post procedure . Has been checking her BP at home since then, -182 / DBP . Denies palpitations, CP, SOB/KHAN, changes in vision, dizziness or syncope.\par \par Currently on metoprolol  mg daily.  Years ago was also on lisinopril. \par \par 120/80s her baseline. \par

## 2023-06-05 NOTE — PHYSICAL EXAM
[No Acute Distress] : no acute distress [No Respiratory Distress] : no respiratory distress  [Clear to Auscultation] : lungs were clear to auscultation bilaterally [Regular Rhythm] : with a regular rhythm [Normal S1, S2] : normal S1 and S2 [No Focal Deficits] : no focal deficits [Alert and Oriented x3] : oriented to person, place, and time [de-identified] : Jeff

## 2023-06-21 ENCOUNTER — APPOINTMENT (OUTPATIENT)
Dept: PAIN MANAGEMENT | Facility: CLINIC | Age: 88
End: 2023-06-21
Payer: MEDICARE

## 2023-06-21 VITALS
DIASTOLIC BLOOD PRESSURE: 74 MMHG | WEIGHT: 126 LBS | SYSTOLIC BLOOD PRESSURE: 142 MMHG | HEIGHT: 60 IN | BODY MASS INDEX: 24.74 KG/M2

## 2023-06-21 PROCEDURE — 99214 OFFICE O/P EST MOD 30 MIN: CPT

## 2023-06-21 NOTE — ASSESSMENT
[FreeTextEntry1] : >> Imaging and Other Studies\par \par I personally reviewed the relevant imaging.  Discussed and explained to patient the likely source of pathology and pain.  Questions answered. MRI \par \par may consider PT vs intervention pending eval\par \par \par >> Therapy and Other Modalities\par \par continue PT\par \par TENS trial\par \par >> Medications\par  \par acetaminophen 650mg q8h prn pain (caution <3g daily)\par \par >> Interventions\par \par Significant component of back and leg pain likely secondary to lumbar spinal stenosis demonstrated on MRI LS.  Will schedule L4-5 interlaminar epidural steroid injection r/b/a discussed\par \par \par \par Significant component of axial back pain secondary to lumbar spondylosis and facet arthropathy demonstrated on MRI LS.  Pain refractory to conservative treatments. sp BILATERAL L4-sacral ala diagnostic medial branch block (2 joints, 3 nerves on each side) with 80% sustained relief of axial back pain\par \par sp repeat BILATERAL L4-sacral ala diagnostic medial branch block (2 joints, 3 nerves on each side) with 100% sustained improvement\par \par Given significant relief from diagnostic lumbar medial branch block of >80%, and significant improvement in function (as measured by MITCHELL) and continued lumbar facet arthropathy pain (demonstrated on imaging) and axial back pain,sp LEFT AND RIGHT  L4-sacral ala medial branch (2 joints, 3 nerves on each side) radiofrequency ablation - will require additional time for efficacy\par \par >> Consults\par \par evaluate right thumb pain - ortho referral\par \par >> Discussion of Risks/Benefits/Alternatives\par \par 	>Regarding any scheduled procedures:\par \par I have discussed in detail with the patient that any interventional pain procedure is associated with potential risks.  The procedure may include an injection of steroids and potentially other medications (local anesthetic and normal saline) into the epidural space or surrounding tissue of the spine.  There are significant risks of this procedure which include and are not limited to infection, bleeding, worsening pain, dural puncture leading to postdural puncture headache, nerve damage, spinal cord injury, paralysis, stroke, and death.  \par \par There is a chance that the procedure does not improve their pain.  \par \par There are risks associated with the steroid being absorbed into the body systemically.  These include dysphoria, difficulty sleeping, mood swings and personality changes.  Premenopausal women may notice an irregularity in her menstrual cycle for 2-3 months following the injection.  Steroids can specifically affect patients with hypertension, diabetes, and peptic ulcers.  The procedure may cause a temporary increase in blood pressure and blood pressure, and may adversely affect a peptic ulcer.  Other, more rare complications, include avascular necrosis of joints, glaucoma and worsening of osteoporosis. \par \par I have discussed the risks of the procedure at length with the patient, and the potential benefits of pain relief.  I have offered alternatives to the procedure.  All questions were answered.  \par \par The patient expressed understanding and wishes to proceed with the procedure.\par \par 	>Regarding COVID19 Pandemic: \par \par Any planned interventional pain procedure are scheduled because further delay may cause harm or negative outcome to patient.  The goal in performing this procedure is to avoid deterioration of function, emergency room visits (which increases exposure) and reliance on opioids.  \par \par r/b/a discussed with patient, lack of evidence to conclusively determine whether pain management procedures have any positive or negative impact on the possibility of noreen the virus and/or development of any sequelae. \par \par Patient counselled regarding timing steroid based intervention 2 weeks before or after COVID-19 vaccine administration to avoid any interaction or affect on efficacy of vaccination\par \par Patient demonstrates understanding\par \par Informed patient that risks associated with the COVID-19 infection.  Informed patient steps taken to limit the risks.  We are implementing safety precautions and following protocols consistent with the CDC and state recommendations. All patients and staff will be checked for fever or signs of illness upon entry to the facility. We will limit our steroid dose to the lowest effective therapeutic dose or in some cases steroids will not be injected at all. \par \par Patient agrees to proceed\par \par >> Conclusion\par \par The above diagnosis and treatment plan is medically reasonable and necessary based on the patient encounter \par There were no barriers to communication.\par Informed patient that I would be available for any additional questions.\par Patient was instructed to call with any worsening symptoms including severe pain, new numbness/weakness, or changes in the bowel/bladder function. \par Discussed role of nsaids in pain management and all relevant risks, if patient is continuing to require after 4 weeks the patient should f/u for alternative treatment. \par Instructed patient to maintain pain diary to monitor pain level, mobility, and function.\par \par \par \par \par

## 2023-06-21 NOTE — HISTORY OF PRESENT ILLNESS
[3] : 3. What number best describes how, during the past week, pain has interfered with your general activity? 3/10 pain [Back Pain] : back pain [___ yrs] : [unfilled] year(s) ago [Constant] : constant [5] : a minimum pain level of 5/10 [6] : a maximum pain level of 6/10 [Dull] : dull [Aching] : aching [Burning] : burning [Medications] : medications [FreeTextEntry1] : Interval Note:\par sp  LEFT AND RIGHT  L4-sacral ala medial branch (2 joints, 3 nerves on each side) radiofrequency ablation 6/1/23\par Reports she continues to have bilateral back pain, worse with transition, radiating to bilateral buttock.  Pain is so bad that patient finds it difficult to perform adls and ambulate. She is frustrated with her pain and reports that malika done in [place] was effective transiently. \par  denies any worsening numbness, weakness, bowel/bladder dysfunction. \par \par \par HPI\par \par  \par Ms. LALIT MCKENNA is a 88 year F with pmhx osteoporosis (was on Prolia), HTN, RA c/o chronic right sided LBP that radiates posteriorly to knee. hx of falls and memory issues  resolved since stopping Tramadol, Cymbalta, and Celebrex. Most recent fall 2021 and hospitalization for broken ribs, elbow, and broken shoulder. Numbness to the bottom of right foot in am the resolved on rising. Denies any additional weakness, numbness, bowel/bladder dysfunction.\par \par Also c/o right thumb pain that began a few months ago. Exacerbated more lately by knitting. \par \par \par Previous and current pain medications/doses/effects: Tylenol \par \par Tramadol, Cymbalta, Celebrex (stopped 6 months ago all dues to dizziness)\par \par \par Previous Pain Treatments: Physical therapy x 3 months\par \par \par Previous Pain Injections: \par LEFT AND RIGHT  L4-sacral ala medial branch (2 joints, 3 nerves on each side) radiofrequency ablation 6/1/23\par BILATERAL L4-sacral ala diagnostic medial branch block (2 joints, 3 nerves on each side) 10/19/22\par BILATERAL L4-sacral ala diagnostic medial branch block (2 joints, 3 nerves on each side) 9/30/22\par Dr Aguirre ([place])- 2021\par  \par Previous Diagnostic Studies/Images:\par \par MRI CS 4/23\par \par Cervical lordosis maintained. Cervical vertebral body heights are maintained. Mild C3-C4\par anterolisthesis.. Cervical vertebral body bone marrow signal within normal limits. There are\par multilevel degenerative endplate changes with osteophyte formation, intervertebral disc space\par narrowing/desiccation, Schmorl's nodes and endplate irregularity. . No suspicious expansile or\par destructive osseous lesion identified. Paraspinal soft tissues are within normal limits. Visualized\par lung apices are unremarkable. Visualized vertebral artery flow voids are preserved. No abnormal cord\par signal identified. No significant periarticular or paraspinal soft tissue edema identified.\par \par  There are multilevel findings as follows:\par \par \par \par  Craniocervical junction unremarkable.\par \par  C2-C3: No significant canal or foraminal stenosis.\par \par  C3-C4: Posterior osteophytic ridging with disc bulging and bilateral uncovertebral/facet\par hypertrophy contributing to no significant canal stenosis and mild bilateral foraminal stenosis.\par \par  C4-C5: Posterior osteophytic ridging with disc bulging and bilateral uncovertebral/facet\par hypertrophy contributing to no significant canal stenosis and moderate bilateral foraminal stenosis.\par \par \par  C5-C6: Posterior osteophytic ridging with disc bulging and bilateral uncovertebral/facet\par hypertrophy contributing to no significant canal stenosis and moderate to severe bilateral foraminal\par stenosis.\par \par \par  C6-C7: Posterior osteophytic ridging with disc bulging and bilateral uncovertebral/facet\par hypertrophy contributing to no significant canal stenosis and moderate bilateral foraminal stenosis.\par \par  C7-T1: No significant canal or foraminal stenosis.\par \par \par \par  IMPRESSION:\par \par  Multilevel cervical spondylitic changes as detailed above most notable for moderate to severe\par bilateral foraminal stenosis at C4-C5, C5-C6 and C6-C7. No significant canal stenosis of the\par cervical spine.\par \par \par MRI  LS 8/22\par \par There is grade 1 anterolisthesis of L5-S1. There is straightening of the normal lumbar lordosis.\par There is S-shaped curvature of the lumbar spine. The vertebral body heights are maintained. The\par vertebral body marrow signal is appropriate for the patient's stated age.\par \par  The conus medullaris ends at L1.\par \par  L1/2 level: Small broad-based posterior disc bulge and mild facet arthropathy without central canal\par and neural foraminal narrowing.\par \par  L2/3  level: Broad-based posterior disc bulge and moderate bilateral facet arthropathy result in\par mild bilateral neural foraminal narrowing and moderate central canal narrowing. There is impingement\par of the bilateral descending [id number] roots within the right and left lateral recesses.\par \par  L3/4 level: Broad-based posterior disc bulge and moderate bilateral facet arthropathy result in\par severe central canal narrowing, mild left neural foraminal narrowing and moderate right neural charan\par inal narrowing. There is impingement of the descending right L4 nerve root within the right lateral\par recess.\par \par  L4/5 level: Broad-based posterior disc bulge and moderate bilateral facet arthropathy (right\par greater than left) result in mild central canal narrowing, moderate to severe right neural foraminal\par narrowing and moderate left neural foraminal narrowing. There is impingement of the descending right\par L5 nerve roots within the lateral recess.\par \par  L5/S1 level: Grade 1 anterolisthesis. Broad-based posterior disc bulge and severe bilateral facet\par arthropathy (right greater than left) result in moderate central canal narrowing, moderate right\par neural foraminal narrowing and mild left neural foraminal narrowing. 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 There is also moderate central canal narrowing\par at L5-S1 in addition to moderate right neural foraminal narrowing and mild left neural foraminal\par narrowing. Additional varying degrees of central canal and neural foraminal narrowing, as above.\par \par \par \par  \par \par \par  [FreeTextEntry2] : 9 [FreeTextEntry7] : Lower back pain , radiating down right leg, also pain in right thumb  [FreeTextEntry3] : n/a [FreeTextEntry4] : PT

## 2023-07-05 ENCOUNTER — APPOINTMENT (OUTPATIENT)
Dept: PAIN MANAGEMENT | Facility: CLINIC | Age: 88
End: 2023-07-05
Payer: MEDICARE

## 2023-07-05 VITALS
WEIGHT: 126 LBS | BODY MASS INDEX: 24.74 KG/M2 | RESPIRATION RATE: 18 BRPM | HEIGHT: 60 IN | HEART RATE: 62 BPM | OXYGEN SATURATION: 99 %

## 2023-07-05 PROCEDURE — 62323 NJX INTERLAMINAR LMBR/SAC: CPT

## 2023-07-05 RX ADMIN — TRIAMCINOLONE ACETONIDE MG/ML: 10 INJECTION, SUSPENSION INTRA-ARTICULAR; INTRALESIONAL at 00:00

## 2023-07-05 RX ADMIN — IOHEXOL MG/ML: 180 INJECTION INTRAVENOUS at 00:00

## 2023-07-05 RX ADMIN — Medication %: at 00:00

## 2023-07-19 ENCOUNTER — APPOINTMENT (OUTPATIENT)
Dept: PAIN MANAGEMENT | Facility: CLINIC | Age: 88
End: 2023-07-19
Payer: MEDICARE

## 2023-07-19 VITALS
HEIGHT: 60 IN | WEIGHT: 126 LBS | DIASTOLIC BLOOD PRESSURE: 67 MMHG | SYSTOLIC BLOOD PRESSURE: 134 MMHG | BODY MASS INDEX: 24.74 KG/M2

## 2023-07-19 PROCEDURE — 99214 OFFICE O/P EST MOD 30 MIN: CPT

## 2023-07-19 NOTE — ASSESSMENT
[FreeTextEntry1] : >> Imaging and Other Studies\par \par I personally reviewed the relevant imaging.  Discussed and explained to patient the likely source of pathology and pain.  Questions answered. MRI \par \par may consider PT vs intervention pending eval\par \par \par >> Therapy and Other Modalities\par \par start PT\par \par TENS trial\par \par >> Medications\par  \par acetaminophen 650mg q8h prn pain (caution <3g daily)\par \par >> Interventions\par \par Significant component of back and leg pain likely secondary to lumbar spinal stenosis demonstrated on MRI LS.  sp  L4-5 interlaminar epidural steroid injection with significant improvement\par \par \par \par Significant component of axial back pain secondary to lumbar spondylosis and facet arthropathy demonstrated on MRI LS.  Pain refractory to conservative treatments. sp BILATERAL L4-sacral ala diagnostic medial branch block (2 joints, 3 nerves on each side) with 80% sustained relief of axial back pain\par \par sp repeat BILATERAL L4-sacral ala diagnostic medial branch block (2 joints, 3 nerves on each side) with 100% sustained improvement\par \par Given significant relief from diagnostic lumbar medial branch block of >80%, and significant improvement in function (as measured by MITCHELL) and continued lumbar facet arthropathy pain (demonstrated on imaging) and axial back pain,sp LEFT AND RIGHT  L4-sacral ala medial branch (2 joints, 3 nerves on each side) radiofrequency ablation with significant improvement\par \par >> Consults\par \par evaluate right thumb pain - ortho referral\par OT eval\par \par >> Discussion of Risks/Benefits/Alternatives\par \par 	>Regarding any scheduled procedures:\par \par I have discussed in detail with the patient that any interventional pain procedure is associated with potential risks.  The procedure may include an injection of steroids and potentially other medications (local anesthetic and normal saline) into the epidural space or surrounding tissue of the spine.  There are significant risks of this procedure which include and are not limited to infection, bleeding, worsening pain, dural puncture leading to postdural puncture headache, nerve damage, spinal cord injury, paralysis, stroke, and death.  \par \par There is a chance that the procedure does not improve their pain.  \par \par There are risks associated with the steroid being absorbed into the body systemically.  These include dysphoria, difficulty sleeping, mood swings and personality changes.  Premenopausal women may notice an irregularity in her menstrual cycle for 2-3 months following the injection.  Steroids can specifically affect patients with hypertension, diabetes, and peptic ulcers.  The procedure may cause a temporary increase in blood pressure and blood pressure, and may adversely affect a peptic ulcer.  Other, more rare complications, include avascular necrosis of joints, glaucoma and worsening of osteoporosis. \par \par I have discussed the risks of the procedure at length with the patient, and the potential benefits of pain relief.  I have offered alternatives to the procedure.  All questions were answered.  \par \par The patient expressed understanding and wishes to proceed with the procedure.\par \par 	>Regarding COVID19 Pandemic: \par \par Any planned interventional pain procedure are scheduled because further delay may cause harm or negative outcome to patient.  The goal in performing this procedure is to avoid deterioration of function, emergency room visits (which increases exposure) and reliance on opioids.  \par \par r/b/a discussed with patient, lack of evidence to conclusively determine whether pain management procedures have any positive or negative impact on the possibility of noreen the virus and/or development of any sequelae. \par \par Patient counselled regarding timing steroid based intervention 2 weeks before or after COVID-19 vaccine administration to avoid any interaction or affect on efficacy of vaccination\par \par Patient demonstrates understanding\par \par Informed patient that risks associated with the COVID-19 infection.  Informed patient steps taken to limit the risks.  We are implementing safety precautions and following protocols consistent with the CDC and state recommendations. All patients and staff will be checked for fever or signs of illness upon entry to the facility. We will limit our steroid dose to the lowest effective therapeutic dose or in some cases steroids will not be injected at all. \par \par Patient agrees to proceed\par \par >> Conclusion\par \par The above diagnosis and treatment plan is medically reasonable and necessary based on the patient encounter \par There were no barriers to communication.\par Informed patient that I would be available for any additional questions.\par Patient was instructed to call with any worsening symptoms including severe pain, new numbness/weakness, or changes in the bowel/bladder function. \par Discussed role of nsaids in pain management and all relevant risks, if patient is continuing to require after 4 weeks the patient should f/u for alternative treatment. \par Instructed patient to maintain pain diary to monitor pain level, mobility, and function.\par \par \par \par \par

## 2023-07-19 NOTE — HISTORY OF PRESENT ILLNESS
[1] : 3. What number best describes how, during the past week, pain has interfered with your general activity? 1/10 pain [Back Pain] : back pain [___ yrs] : [unfilled] year(s) ago [Constant] : constant [5] : a minimum pain level of 5/10 [6] : a maximum pain level of 6/10 [Dull] : dull [Aching] : aching [Burning] : burning [Medications] : medications [FreeTextEntry1] : Interval Note:\par \par sp  L4-5 interlaminar epidural steroid injection 7/5/23 with 100% improvement in back pain. \par minimal back pain.  Patient is doing very well.  Patient reports that she needs thumb splint adjusted. denies any worsening numbness, weakness, bowel/bladder dysfunction\par \par \par \par HPI\par \par  \par Ms. LALIT MCKENNA is a 88 year F with pmhx osteoporosis (was on Prolia), HTN, RA c/o chronic right sided LBP that radiates posteriorly to knee. hx of falls and memory issues  resolved since stopping Tramadol, Cymbalta, and Celebrex. Most recent fall 2021 and hospitalization for broken ribs, elbow, and broken shoulder. Numbness to the bottom of right foot in am the resolved on rising. Denies any additional weakness, numbness, bowel/bladder dysfunction.\par \par Also c/o right thumb pain that began a few months ago. Exacerbated more lately by knitting. \par \par \par Previous and current pain medications/doses/effects: Tylenol \par \par Tramadol, Cymbalta, Celebrex (stopped 6 months ago all dues to dizziness)\par \par \par Previous Pain Treatments: Physical therapy x 3 months\par \par \par Previous Pain Injections: \par \par   L4-5 interlaminar epidural steroid injection 7/5/23 \par LEFT AND RIGHT  L4-sacral ala medial branch (2 joints, 3 nerves on each side) radiofrequency ablation 6/1/23\par BILATERAL L4-sacral ala diagnostic medial branch block (2 joints, 3 nerves on each side) 10/19/22\par BILATERAL L4-sacral ala diagnostic medial branch block (2 joints, 3 nerves on each side) 9/30/22\par Dr Aguirre ([place])- 2021\par  \par Previous Diagnostic Studies/Images:\par \par MRI CS 4/23\par \par Cervical lordosis maintained. Cervical vertebral body heights are maintained. Mild C3-C4\par anterolisthesis.. Cervical vertebral body bone marrow signal within normal limits. There are\par multilevel degenerative endplate changes with osteophyte formation, intervertebral disc space\par narrowing/desiccation, Schmorl's nodes and endplate irregularity. . No suspicious expansile or\par destructive osseous lesion identified. Paraspinal soft tissues are within normal limits. Visualized\par lung apices are unremarkable. Visualized vertebral artery flow voids are preserved. No abnormal cord\par signal identified. No significant periarticular or paraspinal soft tissue edema identified.\par \par  There are multilevel findings as follows:\par \par \par \par  Craniocervical junction unremarkable.\par \par  C2-C3: No significant canal or foraminal stenosis.\par \par  C3-C4: Posterior osteophytic ridging with disc bulging and bilateral uncovertebral/facet\par hypertrophy contributing to no significant canal stenosis and mild bilateral foraminal stenosis.\par \par  C4-C5: Posterior osteophytic ridging with disc bulging and bilateral uncovertebral/facet\par hypertrophy contributing to no significant canal stenosis and moderate bilateral foraminal stenosis.\par \par \par  C5-C6: Posterior osteophytic ridging with disc bulging and bilateral uncovertebral/facet\par hypertrophy contributing to no significant canal stenosis and moderate to severe bilateral foraminal\par stenosis.\par \par \par  C6-C7: Posterior osteophytic ridging with disc bulging and bilateral uncovertebral/facet\par hypertrophy contributing to no significant canal stenosis and moderate bilateral foraminal stenosis.\par \par  C7-T1: No significant canal or foraminal stenosis.\par \par \par \par  IMPRESSION:\par \par  Multilevel cervical spondylitic changes as detailed above most notable for moderate to severe\par bilateral foraminal stenosis at C4-C5, C5-C6 and C6-C7. No significant canal stenosis of the\par cervical spine.\par \par \par MRI  LS 8/22\par \par There is grade 1 anterolisthesis of L5-S1. There is straightening of the normal lumbar lordosis.\par There is S-shaped curvature of the lumbar spine. The vertebral body heights are maintained. The\par vertebral body marrow signal is appropriate for the patient's stated age.\par \par  The conus medullaris ends at L1.\par \par  L1/2 level: Small broad-based posterior disc bulge and mild facet arthropathy without central canal\par and neural foraminal narrowing.\par \par  L2/3  level: Broad-based posterior disc bulge and moderate bilateral facet arthropathy result in\par mild bilateral neural foraminal narrowing and moderate central canal narrowing. There is impingement\par of the bilateral descending [id number] roots within the right and left lateral recesses.\par \par  L3/4 level: Broad-based posterior disc bulge and moderate bilateral facet arthropathy result in\par severe central canal narrowing, mild left neural foraminal narrowing and moderate right neural charan\par inal narrowing. There is impingement of the descending right L4 nerve root within the right lateral\par recess.\par \par  L4/5 level: Broad-based posterior disc bulge and moderate bilateral facet arthropathy (right\par greater than left) result in mild central canal narrowing, moderate to severe right neural foraminal\par narrowing and moderate left neural foraminal narrowing. There is impingement of the descending right\par L5 nerve roots within the lateral recess.\par \par  L5/S1 level: Grade 1 anterolisthesis. Broad-based posterior disc bulge and severe bilateral facet\par arthropathy (right greater than left) result in moderate central canal narrowing, moderate right\par neural foraminal narrowing and mild left neural foraminal narrowing. 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 There is also moderate central canal narrowing\par at L5-S1 in addition to moderate right neural foraminal narrowing and mild left neural foraminal\par narrowing. Additional varying degrees of central canal and neural foraminal narrowing, as above.\par \par \par \par  \par \par \par  [FreeTextEntry2] : 3 [FreeTextEntry7] : Lower back pain , radiating down right leg, also pain in right thumb  [FreeTextEntry3] : n/a [FreeTextEntry4] : PT

## 2023-07-19 NOTE — PHYSICAL EXAM
[Normal muscle bulk without asymmetry] : normal muscle bulk without asymmetry [Facet Tenderness] : no facet tenderness [Normal] : Gait: normal [] : Motor: [NL] : normal and symmetric bilaterally

## 2023-08-25 ENCOUNTER — APPOINTMENT (OUTPATIENT)
Dept: PAIN MANAGEMENT | Facility: CLINIC | Age: 88
End: 2023-08-25
Payer: MEDICARE

## 2023-08-25 VITALS
WEIGHT: 126 LBS | HEIGHT: 60 IN | SYSTOLIC BLOOD PRESSURE: 150 MMHG | DIASTOLIC BLOOD PRESSURE: 75 MMHG | BODY MASS INDEX: 24.74 KG/M2

## 2023-08-25 PROCEDURE — 99214 OFFICE O/P EST MOD 30 MIN: CPT

## 2023-08-25 RX ORDER — DICLOFENAC SODIUM 1% 10 MG/G
1 GEL TOPICAL
Qty: 1 | Refills: 0 | Status: ACTIVE | COMMUNITY
Start: 2023-08-25 | End: 1900-01-01

## 2023-08-25 NOTE — HISTORY OF PRESENT ILLNESS
[7] : a minimum pain level of 7/10 [3] : a maximum pain level of 3/10 [1] : 3. What number best describes how, during the past week, pain has interfered with your general activity? 1/10 pain [FreeTextEntry1] : Interval Note:  Patient reports that she is requiring tylenol about 3-4 tabs daily. Patient overall is doing well but does report some back pain.  minimal back pain.  denies any worsening numbness, weakness, bowel/bladder dysfunction.  Reports tingling in bilateral feet, worse in the AM.     HPI    Ms. LALIT MCKENNA is a 88 year F with pmhx osteoporosis (was on Prolia), HTN, RA c/o chronic right sided LBP that radiates posteriorly to knee. hx of falls and memory issues  resolved since stopping Tramadol, Cymbalta, and Celebrex. Most recent fall 2021 and hospitalization for broken ribs, elbow, and broken shoulder. Numbness to the bottom of right foot in am the resolved on rising. Denies any additional weakness, numbness, bowel/bladder dysfunction.  Also c/o right thumb pain that began a few months ago. Exacerbated more lately by knitting.    Previous and current pain medications/doses/effects: Tylenol   Tramadol, Cymbalta, Celebrex (stopped 6 months ago all dues to dizziness)   Previous Pain Treatments: Physical therapy x 3 months   Previous Pain Injections:     L4-5 interlaminar epidural steroid injection 7/5/23  LEFT AND RIGHT  L4-sacral ala medial branch (2 joints, 3 nerves on each side) radiofrequency ablation 6/1/23 BILATERAL L4-sacral ala diagnostic medial branch block (2 joints, 3 nerves on each side) 10/19/22 BILATERAL L4-sacral ala diagnostic medial branch block (2 joints, 3 nerves on each side) 9/30/22 Dr Aguirre ([place])- 2021   Previous Diagnostic Studies/Images:  MRI CS 4/23  Cervical lordosis maintained. Cervical vertebral body heights are maintained. Mild C3-C4 anterolisthesis.. Cervical vertebral body bone marrow signal within normal limits. There are multilevel degenerative endplate changes with osteophyte formation, intervertebral disc space narrowing/desiccation, Schmorl's nodes and endplate irregularity. . No suspicious expansile or destructive osseous lesion identified. Paraspinal soft tissues are within normal limits. Visualized lung apices are unremarkable. Visualized vertebral artery flow voids are preserved. No abnormal cord signal identified. No significant periarticular or paraspinal soft tissue edema identified.   There are multilevel findings as follows:     Craniocervical junction unremarkable.   C2-C3: No significant canal or foraminal stenosis.   C3-C4: Posterior osteophytic ridging with disc bulging and bilateral uncovertebral/facet hypertrophy contributing to no significant canal stenosis and mild bilateral foraminal stenosis.   C4-C5: Posterior osteophytic ridging with disc bulging and bilateral uncovertebral/facet hypertrophy contributing to no significant canal stenosis and moderate bilateral foraminal stenosis.    C5-C6: Posterior osteophytic ridging with disc bulging and bilateral uncovertebral/facet hypertrophy contributing to no significant canal stenosis and moderate to severe bilateral foraminal stenosis.    C6-C7: Posterior osteophytic ridging with disc bulging and bilateral uncovertebral/facet hypertrophy contributing to no significant canal stenosis and moderate bilateral foraminal stenosis.   C7-T1: No significant canal or foraminal stenosis.     IMPRESSION:   Multilevel cervical spondylitic changes as detailed above most notable for moderate to severe bilateral foraminal stenosis at C4-C5, C5-C6 and C6-C7. No significant canal stenosis of the cervical spine.   MRI  LS 8/22  There is grade 1 anterolisthesis of L5-S1. There is straightening of the normal lumbar lordosis. There is S-shaped curvature of the lumbar spine. The vertebral body heights are maintained. The vertebral body marrow signal is appropriate for the patient's stated age.   The conus medullaris ends at L1.   L1/2 level: Small broad-based posterior disc bulge and mild facet arthropathy without central canal and neural foraminal narrowing.   L2/3  level: Broad-based posterior disc bulge and moderate bilateral facet arthropathy result in mild bilateral neural foraminal narrowing and moderate central canal narrowing. There is impingement of the bilateral descending [id number] roots within the right and left lateral recesses.   L3/4 level: Broad-based posterior disc bulge and moderate bilateral facet arthropathy result in severe central canal narrowing, mild left neural foraminal narrowing and moderate right neural charan inal narrowing. There is impingement of the descending right L4 nerve root within the right lateral recess.   L4/5 level: Broad-based posterior disc bulge and moderate bilateral facet arthropathy (right greater than left) result in mild central canal narrowing, moderate to severe right neural foraminal narrowing and moderate left neural foraminal narrowing. There is impingement of the descending right L5 nerve roots within the lateral recess.   L5/S1 level: Grade 1 anterolisthesis. Broad-based posterior disc bulge and severe bilateral facet arthropathy (right greater than left) result in moderate central canal narrowing, moderate right neural foraminal narrowing and mild left neural foraminal narrowing. There is impingement of the bilateral descending S1 nerve roots within the right and left lateral recesses.     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 There is also moderate central canal narrowing at L5-S1 in addition to moderate right neural foraminal narrowing and mild left neural foraminal narrowing. Additional varying degrees of central canal and neural foraminal narrowing, as above.         [FreeTextEntry2] : 3

## 2023-08-25 NOTE — ASSESSMENT
[FreeTextEntry1] : >> Imaging and Other Studies  I personally reviewed the relevant imaging.  Discussed and explained to patient the likely source of pathology and pain.  Questions answered. MRI   may consider PT vs intervention pending eval   >> Therapy and Other Modalities  start PT  TENS trial  >> Medications   acetaminophen 650mg q8h prn pain (caution <3g daily)  trial voltaren prn pain  >> Interventions  Significant component of back and leg pain likely secondary to lumbar spinal stenosis demonstrated on MRI LS.  sp  L4-5 interlaminar epidural steroid injection with significant improvement    Significant component of axial back pain secondary to lumbar spondylosis and facet arthropathy demonstrated on MRI LS.  Pain refractory to conservative treatments. sp BILATERAL L4-sacral ala diagnostic medial branch block (2 joints, 3 nerves on each side) with 80% sustained relief of axial back pain  sp repeat BILATERAL L4-sacral ala diagnostic medial branch block (2 joints, 3 nerves on each side) with 100% sustained improvement  Given significant relief from diagnostic lumbar medial branch block of >80%, and significant improvement in function (as measured by MITCHELL) and continued lumbar facet arthropathy pain (demonstrated on imaging) and axial back pain,sp LEFT AND RIGHT  L4-sacral ala medial branch (2 joints, 3 nerves on each side) radiofrequency ablation with significant improvement  >> Consults  evaluate right thumb pain - ortho referral OT eval  >> Discussion of Risks/Benefits/Alternatives  	>Regarding any scheduled procedures:  I have discussed in detail with the patient that any interventional pain procedure is associated with potential risks.  The procedure may include an injection of steroids and potentially other medications (local anesthetic and normal saline) into the epidural space or surrounding tissue of the spine.  There are significant risks of this procedure which include and are not limited to infection, bleeding, worsening pain, dural puncture leading to postdural puncture headache, nerve damage, spinal cord injury, paralysis, stroke, and death.    There is a chance that the procedure does not improve their pain.    There are risks associated with the steroid being absorbed into the body systemically.  These include dysphoria, difficulty sleeping, mood swings and personality changes.  Premenopausal women may notice an irregularity in her menstrual cycle for 2-3 months following the injection.  Steroids can specifically affect patients with hypertension, diabetes, and peptic ulcers.  The procedure may cause a temporary increase in blood pressure and blood pressure, and may adversely affect a peptic ulcer.  Other, more rare complications, include avascular necrosis of joints, glaucoma and worsening of osteoporosis.   I have discussed the risks of the procedure at length with the patient, and the potential benefits of pain relief.  I have offered alternatives to the procedure.  All questions were answered.    The patient expressed understanding and wishes to proceed with the procedure.  	>Regarding COVID19 Pandemic:   Any planned interventional pain procedure are scheduled because further delay may cause harm or negative outcome to patient.  The goal in performing this procedure is to avoid deterioration of function, emergency room visits (which increases exposure) and reliance on opioids.    r/b/a discussed with patient, lack of evidence to conclusively determine whether pain management procedures have any positive or negative impact on the possibility of noreen the virus and/or development of any sequelae.   Patient counselled regarding timing steroid based intervention 2 weeks before or after COVID-19 vaccine administration to avoid any interaction or affect on efficacy of vaccination  Patient demonstrates understanding  Informed patient that risks associated with the COVID-19 infection.  Informed patient steps taken to limit the risks.  We are implementing safety precautions and following protocols consistent with the CDC and state recommendations. All patients and staff will be checked for fever or signs of illness upon entry to the facility. We will limit our steroid dose to the lowest effective therapeutic dose or in some cases steroids will not be injected at all.   Patient agrees to proceed  >> Conclusion  The above diagnosis and treatment plan is medically reasonable and necessary based on the patient encounter  There were no barriers to communication. Informed patient that I would be available for any additional questions. Patient was instructed to call with any worsening symptoms including severe pain, new numbness/weakness, or changes in the bowel/bladder function.  Discussed role of nsaids in pain management and all relevant risks, if patient is continuing to require after 4 weeks the patient should f/u for alternative treatment.  Instructed patient to maintain pain diary to monitor pain level, mobility, and function.

## 2023-09-20 ENCOUNTER — APPOINTMENT (OUTPATIENT)
Dept: PAIN MANAGEMENT | Facility: CLINIC | Age: 88
End: 2023-09-20
Payer: MEDICARE

## 2023-09-20 PROCEDURE — 99214 OFFICE O/P EST MOD 30 MIN: CPT

## 2023-09-26 ENCOUNTER — APPOINTMENT (OUTPATIENT)
Dept: GERIATRICS | Facility: CLINIC | Age: 88
End: 2023-09-26
Payer: MEDICARE

## 2023-09-26 VITALS
WEIGHT: 129 LBS | TEMPERATURE: 96.4 F | BODY MASS INDEX: 25.32 KG/M2 | OXYGEN SATURATION: 98 % | DIASTOLIC BLOOD PRESSURE: 72 MMHG | HEIGHT: 60 IN | HEART RATE: 55 BPM | SYSTOLIC BLOOD PRESSURE: 120 MMHG

## 2023-09-26 PROCEDURE — 99215 OFFICE O/P EST HI 40 MIN: CPT

## 2023-09-26 RX ORDER — AMLODIPINE BESYLATE 2.5 MG/1
2.5 TABLET ORAL
Qty: 90 | Refills: 1 | Status: DISCONTINUED | COMMUNITY
Start: 2023-06-05 | End: 2023-09-26

## 2023-10-25 ENCOUNTER — APPOINTMENT (OUTPATIENT)
Dept: RHEUMATOLOGY | Facility: CLINIC | Age: 88
End: 2023-10-25
Payer: MEDICARE

## 2023-10-25 VITALS
HEART RATE: 58 BPM | BODY MASS INDEX: 25.91 KG/M2 | WEIGHT: 132 LBS | SYSTOLIC BLOOD PRESSURE: 126 MMHG | HEIGHT: 60 IN | OXYGEN SATURATION: 97 % | DIASTOLIC BLOOD PRESSURE: 64 MMHG

## 2023-10-25 DIAGNOSIS — M19.049 PRIMARY OSTEOARTHRITIS, UNSPECIFIED HAND: ICD-10-CM

## 2023-10-25 DIAGNOSIS — M19.042 PRIMARY OSTEOARTHRITIS, RIGHT HAND: ICD-10-CM

## 2023-10-25 DIAGNOSIS — M19.041 PRIMARY OSTEOARTHRITIS, RIGHT HAND: ICD-10-CM

## 2023-10-25 PROCEDURE — 99214 OFFICE O/P EST MOD 30 MIN: CPT

## 2023-10-25 RX ORDER — CELECOXIB 200 MG/1
200 CAPSULE ORAL DAILY
Qty: 30 | Refills: 1 | Status: DISCONTINUED | COMMUNITY
Start: 2022-09-02 | End: 2023-10-25

## 2023-10-25 RX ORDER — GABAPENTIN 100 MG/1
100 CAPSULE ORAL
Qty: 90 | Refills: 1 | Status: DISCONTINUED | COMMUNITY
Start: 2023-08-25 | End: 2023-10-25

## 2023-10-25 RX ORDER — TRAMADOL HYDROCHLORIDE 50 MG/1
50 TABLET, COATED ORAL
Qty: 60 | Refills: 0 | Status: COMPLETED | COMMUNITY
Start: 2023-10-25 | End: 2023-11-24

## 2023-10-25 RX ORDER — DICLOFENAC SODIUM 3 G/100G
3 GEL TOPICAL TWICE DAILY
Qty: 2 | Refills: 0 | Status: ACTIVE | COMMUNITY
Start: 2023-10-25 | End: 1900-01-01

## 2023-10-26 ENCOUNTER — APPOINTMENT (OUTPATIENT)
Dept: PAIN MANAGEMENT | Facility: CLINIC | Age: 88
End: 2023-10-26
Payer: MEDICARE

## 2023-10-26 VITALS
TEMPERATURE: 97.4 F | HEIGHT: 60 IN | SYSTOLIC BLOOD PRESSURE: 138 MMHG | WEIGHT: 132 LBS | DIASTOLIC BLOOD PRESSURE: 82 MMHG | BODY MASS INDEX: 25.91 KG/M2

## 2023-10-26 LAB
25(OH)D3 SERPL-MCNC: 87.3 NG/ML
ALBUMIN SERPL ELPH-MCNC: 4.4 G/DL
ALP BLD-CCNC: 64 U/L
ALT SERPL-CCNC: 15 U/L
ANION GAP SERPL CALC-SCNC: 14 MMOL/L
AST SERPL-CCNC: 24 U/L
BASOPHILS # BLD AUTO: 0.03 K/UL
BASOPHILS NFR BLD AUTO: 0.5 %
BILIRUB SERPL-MCNC: 0.4 MG/DL
BUN SERPL-MCNC: 24 MG/DL
CALCIUM SERPL-MCNC: 10.2 MG/DL
CHLORIDE SERPL-SCNC: 105 MMOL/L
CO2 SERPL-SCNC: 25 MMOL/L
CREAT SERPL-MCNC: 1.1 MG/DL
CRP SERPL-MCNC: <3 MG/L
EGFR: 48 ML/MIN/1.73M2
EOSINOPHIL # BLD AUTO: 0.08 K/UL
EOSINOPHIL NFR BLD AUTO: 1.3 %
ERYTHROCYTE [SEDIMENTATION RATE] IN BLOOD BY WESTERGREN METHOD: 20 MM/HR
FOLATE SERPL-MCNC: >20 NG/ML
GLUCOSE SERPL-MCNC: 82 MG/DL
HCT VFR BLD CALC: 40.5 %
HGB BLD-MCNC: 12.5 G/DL
IMM GRANULOCYTES NFR BLD AUTO: 0.2 %
LYMPHOCYTES # BLD AUTO: 1.44 K/UL
LYMPHOCYTES NFR BLD AUTO: 23.7 %
MAN DIFF?: NORMAL
MCHC RBC-ENTMCNC: 30.9 GM/DL
MCHC RBC-ENTMCNC: 31.1 PG
MCV RBC AUTO: 100.7 FL
MONOCYTES # BLD AUTO: 0.54 K/UL
MONOCYTES NFR BLD AUTO: 8.9 %
NEUTROPHILS # BLD AUTO: 3.97 K/UL
NEUTROPHILS NFR BLD AUTO: 65.4 %
PLATELET # BLD AUTO: 170 K/UL
POTASSIUM SERPL-SCNC: 4.5 MMOL/L
PROT SERPL-MCNC: 6.9 G/DL
RBC # BLD: 4.02 M/UL
RBC # FLD: 13.8 %
SODIUM SERPL-SCNC: 143 MMOL/L
VIT B12 SERPL-MCNC: 1053 PG/ML
WBC # FLD AUTO: 6.07 K/UL

## 2023-10-26 PROCEDURE — 99214 OFFICE O/P EST MOD 30 MIN: CPT

## 2023-10-26 RX ORDER — ERGOCALCIFEROL 1.25 MG/1
1.25 MG CAPSULE, LIQUID FILLED ORAL
Qty: 7 | Refills: 0 | Status: DISCONTINUED | COMMUNITY
Start: 2023-10-25 | End: 2023-10-26

## 2023-11-08 ENCOUNTER — APPOINTMENT (OUTPATIENT)
Dept: PAIN MANAGEMENT | Facility: CLINIC | Age: 88
End: 2023-11-08
Payer: MEDICARE

## 2023-11-08 VITALS
HEIGHT: 60 IN | BODY MASS INDEX: 25.91 KG/M2 | WEIGHT: 132 LBS | OXYGEN SATURATION: 100 % | SYSTOLIC BLOOD PRESSURE: 164 MMHG | HEART RATE: 55 BPM | DIASTOLIC BLOOD PRESSURE: 75 MMHG | RESPIRATION RATE: 16 BRPM

## 2023-11-08 PROCEDURE — 62323 NJX INTERLAMINAR LMBR/SAC: CPT

## 2023-11-08 RX ADMIN — TRIAMCINOLONE ACETONIDE 0 MG/ML: 80 INJECTION, SUSPENSION INTRA-ARTICULAR; INTRAMUSCULAR at 00:00

## 2023-11-08 RX ADMIN — Medication %: at 00:00

## 2023-11-08 RX ADMIN — IOHEXOL 0 MG/ML: 180 INJECTION INTRAVENOUS at 00:00

## 2023-11-17 RX ORDER — DENOSUMAB 60 MG/ML
60 INJECTION SUBCUTANEOUS
Qty: 1 | Refills: 0 | Status: ACTIVE | COMMUNITY
Start: 2023-11-17 | End: 1900-01-01

## 2023-12-01 ENCOUNTER — APPOINTMENT (OUTPATIENT)
Dept: PAIN MANAGEMENT | Facility: CLINIC | Age: 88
End: 2023-12-01
Payer: MEDICARE

## 2023-12-01 VITALS
WEIGHT: 132 LBS | SYSTOLIC BLOOD PRESSURE: 159 MMHG | DIASTOLIC BLOOD PRESSURE: 77 MMHG | BODY MASS INDEX: 20 KG/M2 | HEIGHT: 68 IN

## 2023-12-01 PROCEDURE — 99214 OFFICE O/P EST MOD 30 MIN: CPT

## 2024-01-02 ENCOUNTER — LABORATORY RESULT (OUTPATIENT)
Age: 89
End: 2024-01-02

## 2024-01-02 ENCOUNTER — APPOINTMENT (OUTPATIENT)
Dept: GERIATRICS | Facility: CLINIC | Age: 89
End: 2024-01-02
Payer: MEDICARE

## 2024-01-02 VITALS
BODY MASS INDEX: 19.1 KG/M2 | TEMPERATURE: 97.9 F | HEIGHT: 68 IN | WEIGHT: 126 LBS | HEART RATE: 59 BPM | OXYGEN SATURATION: 98 % | SYSTOLIC BLOOD PRESSURE: 120 MMHG | DIASTOLIC BLOOD PRESSURE: 66 MMHG

## 2024-01-02 DIAGNOSIS — K21.9 GASTRO-ESOPHAGEAL REFLUX DISEASE W/OUT ESOPHAGITIS: ICD-10-CM

## 2024-01-02 PROCEDURE — 36415 COLL VENOUS BLD VENIPUNCTURE: CPT

## 2024-01-02 PROCEDURE — G0439: CPT

## 2024-01-03 PROBLEM — K21.9 CHRONIC GERD: Status: ACTIVE | Noted: 2022-07-11

## 2024-01-03 NOTE — HISTORY OF PRESENT ILLNESS
[Spouse] : spouse [Fully Independent] : fully independent [Does not drive] : does not drive [No history of falls] : no history of falls [Smoke Detectors] : smoke detectors [Hot Water Temp <120F] : hot water temp <120F [Bathroom Grab Bars] : bathroom grab bars [Fall Prevention Measures] : fall prevention measures [CPR Training for Household] : received CPR training for household [CPR Training for Patient] : received CPR training for patient [No falls in past year] : Patient reported no falls in the past year [Completely Independent] : Completely independent. [Smoke Detector] : smoke detector [Carbon Monoxide Detector] : carbon monoxide detector [Stair Lift] : stair lift used in home [Grab Bars] : grab bars [Shower Chair] : shower chair [Night Light] : night light [Anti-Slip Measures] : anti-slip measures [0] : 2) Feeling down, depressed, or hopeless: Not at all (0) [PMH Reviewed and Updated] : past medical history reviewed and updated [PSH Reviewed and Updated] : past surgical history reviewed and updated [Family History Reviewed and Updated] : family history reviewed and updated [Medication and Allergies Reconciled] : medication and allergies reconciled [Over the Past 2 Weeks, Have You Felt Down, Depressed, or Hopeless?] : 1.) Over the past 2 weeks, have you felt down, depressed, or hopeless? No [Over the Past 2 Weeks, Have You Felt Little Interest or Pleasure Doing Things?] : 2.) Over the past 2 weeks, have you felt little interest or pleasure doing things? No [Retired] : retired from work [None] : The patient has no concerns about alcohol abuse [Never] : has never used illicit drugs [Compliant with medications] : compliant with medications [Unable To Manage Meds] : ability to manage ~his/her~ medications [Adequate] : adequate [Friends] : friends [FreeTextEntry1] : 89 year old female presenting for annual Had been living in [place] 30 years [place]oved to [place] [facility]  Fell in [place] and hurt her L shoulder  She required surgery also was later found to have elbow fracture no current shoulder issues but feels L arm is weak due to elbow injury       [PHQ-2 Negative - No further assessment needed] : PHQ-2 Negative - No further assessment needed [[id number]] : 0

## 2024-01-03 NOTE — ASSESSMENT
[FreeTextEntry1] : bladder issues are consuming will dc dyazide and start losartan instead RTC 4 weeks to review labs drawn in office breast exam unrevealing  consider dexa in near future right now polyuria is priority

## 2024-01-03 NOTE — SOCIAL HISTORY
[With spouse] : lives with spouse [Apartment] : in an apartment [FreeTextEntry1] : lives [facility] the club in [facility]

## 2024-01-03 NOTE — PHYSICAL EXAM
[Alert] : alert [Well Nourished] : well nourished [Well Developed] : well developed [Sclera] : the sclera and conjunctiva were normal [EOMI] : extraocular movements were intact [PERRL] : pupils were equal in size, round, and reactive to light [Normal Oral Mucosa] : normal oral mucosa [No Oral Pallor] : no oral pallor [No Neck Mass] : no neck mass was observed [No Respiratory Distress] : no respiratory distress [No Acc Muscle Use] : no accessory muscle use [Respiration, Rhythm And Depth] : normal respiratory rhythm and effort [Auscultation Breath Sounds / Voice Sounds] : lungs were clear to auscultation bilaterally [Normal S1, S2] : normal S1 and S2 [Normal Appearance] : normal in appearance [Breast Palpation Mass] : no palpable masses [No Nipple Discharge] : no nipple discharge [Bowel Sounds] : normal bowel sounds [Abdomen Tenderness] : non-tender [Abdomen Soft] : soft [Cervical Lymph Nodes Enlarged Posterior Bilaterally] : posterior cervical [Supraclavicular Lymph Nodes Enlarged Bilaterally] : supraclavicular [Cervical Lymph Nodes Enlarged Anterior Bilaterally] : anterior cervical, supraclavicular [No CVA Tenderness] : no CVA  tenderness [No Spinal Tenderness] : no spinal tenderness [Normal Color / Pigmentation] : normal skin color and pigmentation [] : no rash [Skin Lesions] : no skin lesions [Normal Affect] : the affect was normal [Normal Mood] : the mood was normal

## 2024-01-04 LAB
25(OH)D3 SERPL-MCNC: 67 NG/ML
ALBUMIN SERPL ELPH-MCNC: 4.4 G/DL
ALP BLD-CCNC: 71 U/L
ALT SERPL-CCNC: 20 U/L
ANION GAP SERPL CALC-SCNC: 12 MMOL/L
APPEARANCE: CLEAR
AST SERPL-CCNC: 21 U/L
BASOPHILS # BLD AUTO: 0.03 K/UL
BASOPHILS NFR BLD AUTO: 0.5 %
BILIRUB SERPL-MCNC: 0.6 MG/DL
BILIRUBIN URINE: NEGATIVE
BLOOD URINE: NEGATIVE
BUN SERPL-MCNC: 17 MG/DL
CALCIUM SERPL-MCNC: 10.2 MG/DL
CHLORIDE SERPL-SCNC: 106 MMOL/L
CHOLEST SERPL-MCNC: 203 MG/DL
CO2 SERPL-SCNC: 27 MMOL/L
COLOR: YELLOW
CREAT SERPL-MCNC: 0.93 MG/DL
EGFR: 59 ML/MIN/1.73M2
EOSINOPHIL # BLD AUTO: 0.11 K/UL
EOSINOPHIL NFR BLD AUTO: 1.8 %
GLUCOSE QUALITATIVE U: NEGATIVE MG/DL
GLUCOSE SERPL-MCNC: 87 MG/DL
HCT VFR BLD CALC: 42.5 %
HDLC SERPL-MCNC: 53 MG/DL
HGB BLD-MCNC: 12.8 G/DL
IMM GRANULOCYTES NFR BLD AUTO: 0.2 %
KETONES URINE: NEGATIVE MG/DL
LDLC SERPL CALC-MCNC: 122 MG/DL
LEUKOCYTE ESTERASE URINE: ABNORMAL
LYMPHOCYTES # BLD AUTO: 1.68 K/UL
LYMPHOCYTES NFR BLD AUTO: 26.8 %
MAN DIFF?: NORMAL
MCHC RBC-ENTMCNC: 30.1 GM/DL
MCHC RBC-ENTMCNC: 30.1 PG
MCV RBC AUTO: 100 FL
MONOCYTES # BLD AUTO: 0.58 K/UL
MONOCYTES NFR BLD AUTO: 9.2 %
NEUTROPHILS # BLD AUTO: 3.87 K/UL
NEUTROPHILS NFR BLD AUTO: 61.5 %
NITRITE URINE: NEGATIVE
NONHDLC SERPL-MCNC: 150 MG/DL
PH URINE: 6
PLATELET # BLD AUTO: 181 K/UL
POTASSIUM SERPL-SCNC: 4.6 MMOL/L
PROT SERPL-MCNC: 6.8 G/DL
PROTEIN URINE: NEGATIVE MG/DL
RBC # BLD: 4.25 M/UL
RBC # FLD: 15.3 %
SODIUM SERPL-SCNC: 144 MMOL/L
SPECIFIC GRAVITY URINE: 1.02
TRIGL SERPL-MCNC: 157 MG/DL
TSH SERPL-ACNC: 2.99 UIU/ML
UROBILINOGEN URINE: 0.2 MG/DL
VIT B12 SERPL-MCNC: 906 PG/ML
WBC # FLD AUTO: 6.28 K/UL

## 2024-01-26 RX ORDER — PANTOPRAZOLE 40 MG/1
40 TABLET, DELAYED RELEASE ORAL DAILY
Qty: 90 | Refills: 1 | Status: ACTIVE | COMMUNITY
Start: 2022-07-11 | End: 1900-01-01

## 2024-02-08 ENCOUNTER — APPOINTMENT (OUTPATIENT)
Dept: GERIATRICS | Facility: CLINIC | Age: 89
End: 2024-02-08
Payer: MEDICARE

## 2024-02-08 VITALS
SYSTOLIC BLOOD PRESSURE: 140 MMHG | WEIGHT: 126.9 LBS | DIASTOLIC BLOOD PRESSURE: 62 MMHG | OXYGEN SATURATION: 98 % | HEART RATE: 62 BPM | TEMPERATURE: 97.5 F | BODY MASS INDEX: 25.25 KG/M2

## 2024-02-08 PROCEDURE — 99215 OFFICE O/P EST HI 40 MIN: CPT

## 2024-02-08 PROCEDURE — G2211 COMPLEX E/M VISIT ADD ON: CPT

## 2024-02-08 RX ORDER — FOLIC ACID 1 MG/1
1 TABLET ORAL DAILY
Qty: 90 | Refills: 0 | Status: COMPLETED | COMMUNITY
Start: 2022-07-11 | End: 2024-02-08

## 2024-02-08 RX ORDER — METHOTREXATE 2.5 MG/1
2.5 TABLET ORAL
Qty: 16 | Refills: 1 | Status: COMPLETED | COMMUNITY
Start: 2022-07-11 | End: 2024-02-08

## 2024-02-08 RX ORDER — MIRABEGRON 50 MG/1
50 TABLET, FILM COATED, EXTENDED RELEASE ORAL
Qty: 90 | Refills: 3 | Status: COMPLETED | COMMUNITY
Start: 2022-07-11 | End: 2024-02-08

## 2024-02-08 RX ORDER — ESTRADIOL 10 UG/1
10 TABLET VAGINAL
Qty: 180 | Refills: 0 | Status: ACTIVE | COMMUNITY
Start: 2024-02-08 | End: 1900-01-01

## 2024-02-08 RX ORDER — METOPROLOL SUCCINATE 100 MG/1
100 TABLET, EXTENDED RELEASE ORAL TWICE DAILY
Qty: 180 | Refills: 3 | Status: ACTIVE | COMMUNITY
Start: 1900-01-01 | End: 1900-01-01

## 2024-02-08 RX ORDER — LOSARTAN POTASSIUM 25 MG/1
25 TABLET, FILM COATED ORAL
Qty: 90 | Refills: 3 | Status: ACTIVE | COMMUNITY
Start: 2024-01-02 | End: 1900-01-01

## 2024-02-08 NOTE — PHYSICAL EXAM
[Alert] : alert [Well Nourished] : well nourished [Well Developed] : well developed [Sclera] : the sclera and conjunctiva were normal [EOMI] : extraocular movements were intact [PERRL] : pupils were equal in size, round, and reactive to light [Normal Oral Mucosa] : normal oral mucosa [No Oral Pallor] : no oral pallor [No Neck Mass] : no neck mass was observed [No Respiratory Distress] : no respiratory distress [No Acc Muscle Use] : no accessory muscle use [Respiration, Rhythm And Depth] : normal respiratory rhythm and effort [Auscultation Breath Sounds / Voice Sounds] : lungs were clear to auscultation bilaterally [Normal PMI] : the apical impulse was normal [Normal S1, S2] : normal S1 and S2 [Normal Appearance] : normal in appearance [Breast Palpation Mass] : no palpable masses [No Nipple Discharge] : no nipple discharge [Bowel Sounds] : normal bowel sounds [Abdomen Tenderness] : non-tender [Abdomen Soft] : soft [Cervical Lymph Nodes Enlarged Posterior Bilaterally] : posterior cervical [Supraclavicular Lymph Nodes Enlarged Bilaterally] : supraclavicular [Cervical Lymph Nodes Enlarged Anterior Bilaterally] : anterior cervical, supraclavicular [No CVA Tenderness] : no CVA  tenderness [No Spinal Tenderness] : no spinal tenderness [Normal Color / Pigmentation] : normal skin color and pigmentation [] : no rash [Skin Lesions] : no skin lesions [Normal Affect] : the affect was normal [Normal Mood] : the mood was normal

## 2024-02-08 NOTE — HISTORY OF PRESENT ILLNESS
[No falls in past year] : Patient reported no falls in the past year [0] : 2) Feeling down, depressed, or hopeless: Not at all (0) [Completely Independent] : Completely independent. [PHQ-2 Negative - No further assessment needed] : PHQ-2 Negative - No further assessment needed [FreeTextEntry1] : 89 year old female presenting for annual Had been living in [place] 30 years [place]oved to [place] [facility]  Fell [place] and hurt her L shoulder  She required surgery also was later found to have elbow fracture no current shoulder issues but feels L arm is weak due to elbow injury       [Smoke Detector] : no smoke detector [[id number]] : 0

## 2024-02-08 NOTE — ASSESSMENT
[FreeTextEntry1] : stopped amlodipine. stopped maxzide start losartan 25mg daily but BPs are greatly improved and today's reading is excellent. likely has white coat htn unclear if yuvafem is helping advised to continue until nextx visit

## 2024-02-09 ENCOUNTER — APPOINTMENT (OUTPATIENT)
Dept: PAIN MANAGEMENT | Facility: CLINIC | Age: 89
End: 2024-02-09
Payer: MEDICARE

## 2024-02-09 DIAGNOSIS — M25.522 PAIN IN LEFT ELBOW: ICD-10-CM

## 2024-02-09 DIAGNOSIS — M79.646 PAIN IN UNSPECIFIED FINGER(S): ICD-10-CM

## 2024-02-09 PROCEDURE — G2211 COMPLEX E/M VISIT ADD ON: CPT

## 2024-02-09 PROCEDURE — 99214 OFFICE O/P EST MOD 30 MIN: CPT

## 2024-02-09 NOTE — HISTORY OF PRESENT ILLNESS
[FreeTextEntry1] : Interval Note:  sp  L4-5 interlaminar epidural steroid injection with significant improvement in back and leg pain.  Patient is doing very well.  Complains of hand and left elbow pain.  denies any worsening numbness, weakness, bowel/bladder dysfunction    HPI    Ms. LALIT MCKENNA is a 89 year F with pmhx osteoporosis (was on Prolia), HTN, RA c/o chronic right sided LBP that radiates posteriorly to knee. hx of falls and memory issues  resolved since stopping Tramadol, Cymbalta, and Celebrex. Most recent fall 2021 and hospitalization for broken ribs, elbow, and broken shoulder. Numbness to the bottom of right foot in am the resolved on rising. Denies any additional weakness, numbness, bowel/bladder dysfunction.  Also c/o right thumb pain that began a few months ago. Exacerbated more lately by knitting.    Previous and current pain medications/doses/effects: Tylenol   Tramadol, Cymbalta, Celebrex (stopped 6 months ago all dues to dizziness)   Previous Pain Treatments: Physical therapy x 3 months   Previous Pain Injections:   L4-5 interlaminar epidural steroid injection 11/8/23   L4-5 interlaminar epidural steroid injection 7/5/23  LEFT AND RIGHT  L4-sacral ala medial branch (2 joints, 3 nerves on each side) radiofrequency ablation 6/1/23 BILATERAL L4-sacral ala diagnostic medial branch block (2 joints, 3 nerves on each side) 10/19/22 BILATERAL L4-sacral ala diagnostic medial branch block (2 joints, 3 nerves on each side) 9/30/22 Dr Aguirre ([place])- 2021   Previous Diagnostic Studies/Images:  MRI CS 4/23  Cervical lordosis maintained. Cervical vertebral body heights are maintained. Mild C3-C4 anterolisthesis.. Cervical vertebral body bone marrow signal within normal limits. There are multilevel degenerative endplate changes with osteophyte formation, intervertebral disc space narrowing/desiccation, Schmorl's nodes and endplate irregularity. . No suspicious expansile or destructive osseous lesion identified. Paraspinal soft tissues are within normal limits. Visualized lung apices are unremarkable. Visualized vertebral artery flow voids are preserved. No abnormal cord signal identified. No significant periarticular or paraspinal soft tissue edema identified.   There are multilevel findings as follows:     Craniocervical junction unremarkable.   C2-C3: No significant canal or foraminal stenosis.   C3-C4: Posterior osteophytic ridging with disc bulging and bilateral uncovertebral/facet hypertrophy contributing to no significant canal stenosis and mild bilateral foraminal stenosis.   C4-C5: Posterior osteophytic ridging with disc bulging and bilateral uncovertebral/facet hypertrophy contributing to no significant canal stenosis and moderate bilateral foraminal stenosis.    C5-C6: Posterior osteophytic ridging with disc bulging and bilateral uncovertebral/facet hypertrophy contributing to no significant canal stenosis and moderate to severe bilateral foraminal stenosis.    C6-C7: Posterior osteophytic ridging with disc bulging and bilateral uncovertebral/facet hypertrophy contributing to no significant canal stenosis and moderate bilateral foraminal stenosis.   C7-T1: No significant canal or foraminal stenosis.     IMPRESSION:   Multilevel cervical spondylitic changes as detailed above most notable for moderate to severe bilateral foraminal stenosis at C4-C5, C5-C6 and C6-C7. No significant canal stenosis of the cervical spine.   MRI  LS 8/22  There is grade 1 anterolisthesis of L5-S1. There is straightening of the normal lumbar lordosis. There is S-shaped curvature of the lumbar spine. The vertebral body heights are maintained. The vertebral body marrow signal is appropriate for the patient's stated age.   The conus medullaris ends at L1.   L1/2 level: Small broad-based posterior disc bulge and mild facet arthropathy without central canal and neural foraminal narrowing.   L2/3  level: Broad-based posterior disc bulge and moderate bilateral facet arthropathy result in mild bilateral neural foraminal narrowing and moderate central canal narrowing. There is impingement of the bilateral descending [id number] roots within the right and left lateral recesses.   L3/4 level: Broad-based posterior disc bulge and moderate bilateral facet arthropathy result in severe central canal narrowing, mild left neural foraminal narrowing and moderate right neural charan inal narrowing. There is impingement of the descending right L4 nerve root within the right lateral recess.   L4/5 level: Broad-based posterior disc bulge and moderate bilateral facet arthropathy (right greater than left) result in mild central canal narrowing, moderate to severe right neural foraminal narrowing and moderate left neural foraminal narrowing. There is impingement of the descending right L5 nerve roots within the lateral recess.   L5/S1 level: Grade 1 anterolisthesis. Broad-based posterior disc bulge and severe bilateral facet arthropathy (right greater than left) result in moderate central canal narrowing, moderate right neural foraminal narrowing and mild left neural foraminal narrowing. There is impingement of the bilateral descending S1 nerve roots within the right and left lateral recesses.     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 There is also moderate central canal narrowing at L5-S1 in addition to moderate right neural foraminal narrowing and mild left neural foraminal narrowing. Additional varying degrees of central canal and neural foraminal narrowing, as above.         [7] : an average pain level of 7/10 [3] : a maximum pain level of 3/10 [1] : 3. What number best describes how, during the past week, pain has interfered with your general activity? 1/10 pain [FreeTextEntry2] : 3

## 2024-02-09 NOTE — ASSESSMENT
[FreeTextEntry1] : >> Imaging and Other Studies  I personally reviewed the relevant imaging. Discussed and explained to patient the likely source of pathology and pain. Questions answered. MRI  may consider PT vs intervention pending eval   >> Therapy and Other Modalities  start OT for hand and elbow   TENS trial  >> Medications  acetaminophen 650mg q8h prn pain (caution <3g daily)  trial voltaren prn pain  >> Interventions  Significant component of back and leg pain likely secondary to lumbar spinal stenosis demonstrated on MRI LS. sp L4-5 interlaminar epidural steroid injection with significant improvement  given efficacy of previous intervention that is >80% improvement in pain and improved ability to perform adls, and return of pain despite conservative treatment, sp L4-5 interlaminar epidural steroid injection with significant improvement in back and buttock pain  Significant component of axial back pain secondary to lumbar spondylosis and facet arthropathy demonstrated on MRI LS. Pain refractory to conservative treatments. sp BILATERAL L4-sacral ala diagnostic medial branch block (2 joints, 3 nerves on each side) with 80% sustained relief of axial back pain  sp repeat BILATERAL L4-sacral ala diagnostic medial branch block (2 joints, 3 nerves on each side) with 100% sustained improvement  Given significant relief from diagnostic lumbar medial branch block of >80%, and significant improvement in function (as measured by MITCHELL) and continued lumbar facet arthropathy pain (demonstrated on imaging) and axial back pain, sp LEFT AND RIGHT L4-sacral ala medial branch (2 joints, 3 nerves on each side) radiofrequency ablation with significant improvement  >> Consults  na  >> Discussion of Risks/Benefits/Alternatives   >Regarding any scheduled procedures:  I have discussed in detail with the patient that any interventional pain procedure is associated with potential risks. The procedure may include an injection of steroids and potentially other medications (local anesthetic and normal saline) into the epidural space or surrounding tissue of the spine. There are significant risks of this procedure which include and are not limited to infection, bleeding, worsening pain, dural puncture leading to postdural puncture headache, nerve damage, spinal cord injury, paralysis, stroke, and death.  There is a chance that the procedure does not improve their pain.  There are risks associated with the steroid being absorbed into the body systemically. These include dysphoria, difficulty sleeping, mood swings and personality changes. Premenopausal women may notice an irregularity in her menstrual cycle for 2-3 months following the injection. Steroids can specifically affect patients with hypertension, diabetes, and peptic ulcers. The procedure may cause a temporary increase in blood pressure and blood pressure, and may adversely affect a peptic ulcer. Other, more rare complications, include avascular necrosis of joints, glaucoma and worsening of osteoporosis.  I have discussed the risks of the procedure at length with the patient, and the potential benefits of pain relief. I have offered alternatives to the procedure. All questions were answered.  The patient expressed understanding and wishes to proceed with the procedure.   > Longitudinal management of Complex Painful condition   The patient is being managed for a complex condition that requires ongoing management.  The nature of this condition demands nuanced approach to treatment.  The seriousness of the condition necessitates an in-depth and focused approach to management and coordination with other healthcare professionals.    This visit involves intricate evaluation and management of the patient's condition.  The complexity of the visit was due to the need for detailed assessment of the current state, consideration of potential complications and a careful balancing of treatment options to management the chronic condition effectively.   As detailed above, the patient has a chronic significant painful condition that requires regular and detailed management.  The condition's impact on the patient's quality of life and health is substantial and necessitates a comprehensive and tailored approach   >> Conclusion   There were no barriers to communication. Informed patient that I would be available for any additional questions. Patient was instructed to call with any worsening symptoms including severe pain, new numbness/weakness, or changes in the bowel/bladder function. Discussed role of nsaids in pain management and all relevant risks, if patient is continuing to require after 4 weeks the patient should f/u for alternative treatment. Instructed patient to maintain pain diary to monitor pain level, mobility, and function.

## 2024-02-09 NOTE — PHYSICAL EXAM
[Normal muscle bulk without asymmetry] : normal muscle bulk without asymmetry [Normal Spine curvature] : normal spine curvature [Normal] : Gait: normal

## 2024-03-19 ENCOUNTER — APPOINTMENT (OUTPATIENT)
Dept: PAIN MANAGEMENT | Facility: CLINIC | Age: 89
End: 2024-03-19
Payer: MEDICARE

## 2024-03-19 VITALS
WEIGHT: 126 LBS | BODY MASS INDEX: 25.06 KG/M2 | HEIGHT: 59.45 IN | DIASTOLIC BLOOD PRESSURE: 74 MMHG | SYSTOLIC BLOOD PRESSURE: 125 MMHG

## 2024-03-19 PROCEDURE — G2211 COMPLEX E/M VISIT ADD ON: CPT

## 2024-03-19 PROCEDURE — 99214 OFFICE O/P EST MOD 30 MIN: CPT

## 2024-03-19 NOTE — ASSESSMENT
[FreeTextEntry1] : >> Imaging and Other Studies  I personally reviewed the relevant imaging. Discussed and explained to patient the likely source of pathology and pain. Questions answered. MRI  may consider PT vs intervention pending eval   >> Therapy and Other Modalities  OT for hand and elbow   start PT - referrla provided TENS trial  >> Medications  acetaminophen 650mg q8h prn pain (caution <3g daily)  trial voltaren prn pain  >> Interventions  Significant component of back and leg pain likely secondary to lumbar spinal stenosis demonstrated on MRI LS. sp L4-5 interlaminar epidural steroid injection with significant improvement  given efficacy of previous intervention that is >80% improvement in pain and improved ability to perform adls, and return of pain despite conservative treatment, sp L4-5 interlaminar epidural steroid injection with significant improvement in back and buttock pain  Significant component of axial back pain secondary to lumbar spondylosis and facet arthropathy demonstrated on MRI LS. Pain refractory to conservative treatments. sp BILATERAL L4-sacral ala diagnostic medial branch block (2 joints, 3 nerves on each side) with 80% sustained relief of axial back pain  sp repeat BILATERAL L4-sacral ala diagnostic medial branch block (2 joints, 3 nerves on each side) with 100% sustained improvement  Given significant relief from diagnostic lumbar medial branch block of >80%, and significant improvement in function (as measured by MITCHELL) and continued lumbar facet arthropathy pain (demonstrated on imaging) and axial back pain, sp LEFT AND RIGHT L4-sacral ala medial branch (2 joints, 3 nerves on each side) radiofrequency ablation with significant improvement  >> Consults  na  >> Discussion of Risks/Benefits/Alternatives   >Regarding any scheduled procedures:  I have discussed in detail with the patient that any interventional pain procedure is associated with potential risks. The procedure may include an injection of steroids and potentially other medications (local anesthetic and normal saline) into the epidural space or surrounding tissue of the spine. There are significant risks of this procedure which include and are not limited to infection, bleeding, worsening pain, dural puncture leading to postdural puncture headache, nerve damage, spinal cord injury, paralysis, stroke, and death.  There is a chance that the procedure does not improve their pain.  There are risks associated with the steroid being absorbed into the body systemically. These include dysphoria, difficulty sleeping, mood swings and personality changes. Premenopausal women may notice an irregularity in her menstrual cycle for 2-3 months following the injection. Steroids can specifically affect patients with hypertension, diabetes, and peptic ulcers. The procedure may cause a temporary increase in blood pressure and blood pressure, and may adversely affect a peptic ulcer. Other, more rare complications, include avascular necrosis of joints, glaucoma and worsening of osteoporosis.  I have discussed the risks of the procedure at length with the patient, and the potential benefits of pain relief. I have offered alternatives to the procedure. All questions were answered.  The patient expressed understanding and wishes to proceed with the procedure.   > Longitudinal management of Complex Painful condition   The patient is being managed for a complex condition that requires ongoing management.  The nature of this condition demands nuanced approach to treatment.  The seriousness of the condition necessitates an in-depth and focused approach to management and coordination with other healthcare professionals.    This visit involves intricate evaluation and management of the patient's condition.  The complexity of the visit was due to the need for detailed assessment of the current state, consideration of potential complications and a careful balancing of treatment options to management the chronic condition effectively.   As detailed above, the patient has a chronic significant painful condition that requires regular and detailed management.  The condition's impact on the patient's quality of life and health is substantial and necessitates a comprehensive and tailored approach   >> Conclusion   There were no barriers to communication. Informed patient that I would be available for any additional questions. Patient was instructed to call with any worsening symptoms including severe pain, new numbness/weakness, or changes in the bowel/bladder function. Discussed role of nsaids in pain management and all relevant risks, if patient is continuing to require after 4 weeks the patient should f/u for alternative treatment. Instructed patient to maintain pain diary to monitor pain level, mobility, and function.

## 2024-03-19 NOTE — HISTORY OF PRESENT ILLNESS
[7] : a minimum pain level of 7/10 [3] : a maximum pain level of 3/10 [1] : 3. What number best describes how, during the past week, pain has interfered with your general activity? 1/10 pain [FreeTextEntry1] : Interval Note:  sp  L4-5 interlaminar epidural steroid injection with significant improvement in back and leg pain.  Patient is doing very well.  Reports that she is taking 2 tylenols daily which improves her pain and able to perform adls.  denies any worsening numbness, weakness, bowel/bladder dysfunction. Utilizes voltaren for her thumb pain, able to function with minimal discomfort.  Continues OT.     HPI    Ms. LALIT MCKENNA is a 89 year F with pmhx osteoporosis (was on Prolia), HTN, RA c/o chronic right sided LBP that radiates posteriorly to knee. hx of falls and memory issues  resolved since stopping Tramadol, Cymbalta, and Celebrex. Most recent fall 2021 and hospitalization for broken ribs, elbow, and broken shoulder. Numbness to the bottom of right foot in am the resolved on rising. Denies any additional weakness, numbness, bowel/bladder dysfunction.  Also c/o right thumb pain that began a few months ago. Exacerbated more lately by knitting.    Previous and current pain medications/doses/effects: Tylenol   Tramadol, Cymbalta, Celebrex (stopped 6 months ago all dues to dizziness)   Previous Pain Treatments: Physical therapy x 3 months   Previous Pain Injections:   L4-5 interlaminar epidural steroid injection 11/8/23   L4-5 interlaminar epidural steroid injection 7/5/23  LEFT AND RIGHT  L4-sacral ala medial branch (2 joints, 3 nerves on each side) radiofrequency ablation 6/1/23 BILATERAL L4-sacral ala diagnostic medial branch block (2 joints, 3 nerves on each side) 10/19/22 BILATERAL L4-sacral ala diagnostic medial branch block (2 joints, 3 nerves on each side) 9/30/22 Dr Aguirre ([place])- 2021   Previous Diagnostic Studies/Images:  MRI CS 4/23  Cervical lordosis maintained. Cervical vertebral body heights are maintained. Mild C3-C4 anterolisthesis.. Cervical vertebral body bone marrow signal within normal limits. There are multilevel degenerative endplate changes with osteophyte formation, intervertebral disc space narrowing/desiccation, Schmorl's nodes and endplate irregularity. . No suspicious expansile or destructive osseous lesion identified. Paraspinal soft tissues are within normal limits. Visualized lung apices are unremarkable. Visualized vertebral artery flow voids are preserved. No abnormal cord signal identified. No significant periarticular or paraspinal soft tissue edema identified.   There are multilevel findings as follows:     Craniocervical junction unremarkable.   C2-C3: No significant canal or foraminal stenosis.   C3-C4: Posterior osteophytic ridging with disc bulging and bilateral uncovertebral/facet hypertrophy contributing to no significant canal stenosis and mild bilateral foraminal stenosis.   C4-C5: Posterior osteophytic ridging with disc bulging and bilateral uncovertebral/facet hypertrophy contributing to no significant canal stenosis and moderate bilateral foraminal stenosis.    C5-C6: Posterior osteophytic ridging with disc bulging and bilateral uncovertebral/facet hypertrophy contributing to no significant canal stenosis and moderate to severe bilateral foraminal stenosis.    C6-C7: Posterior osteophytic ridging with disc bulging and bilateral uncovertebral/facet hypertrophy contributing to no significant canal stenosis and moderate bilateral foraminal stenosis.   C7-T1: No significant canal or foraminal stenosis.     IMPRESSION:   Multilevel cervical spondylitic changes as detailed above most notable for moderate to severe bilateral foraminal stenosis at C4-C5, C5-C6 and C6-C7. No significant canal stenosis of the cervical spine.   MRI  LS 8/22  There is grade 1 anterolisthesis of L5-S1. There is straightening of the normal lumbar lordosis. There is S-shaped curvature of the lumbar spine. The vertebral body heights are maintained. The vertebral body marrow signal is appropriate for the patient's stated age.   The conus medullaris ends at L1.   L1/2 level: Small broad-based posterior disc bulge and mild facet arthropathy without central canal and neural foraminal narrowing.   L2/3  level: Broad-based posterior disc bulge and moderate bilateral facet arthropathy result in mild bilateral neural foraminal narrowing and moderate central canal narrowing. There is impingement of the bilateral descending [id number] roots within the right and left lateral recesses.   L3/4 level: Broad-based posterior disc bulge and moderate bilateral facet arthropathy result in severe central canal narrowing, mild left neural foraminal narrowing and moderate right neural charan inal narrowing. There is impingement of the descending right L4 nerve root within the right lateral recess.   L4/5 level: Broad-based posterior disc bulge and moderate bilateral facet arthropathy (right greater than left) result in mild central canal narrowing, moderate to severe right neural foraminal narrowing and moderate left neural foraminal narrowing. There is impingement of the descending right L5 nerve roots within the lateral recess.   L5/S1 level: Grade 1 anterolisthesis. Broad-based posterior disc bulge and severe bilateral facet arthropathy (right greater than left) result in moderate central canal narrowing, moderate right neural foraminal narrowing and mild left neural foraminal narrowing. There is impingement of the bilateral descending S1 nerve roots within the right and left lateral recesses.     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 There is also moderate central canal narrowing at L5-S1 in addition to moderate right neural foraminal narrowing and mild left neural foraminal narrowing. Additional varying degrees of central canal and neural foraminal narrowing, as above.         [FreeTextEntry2] : 3

## 2024-03-19 NOTE — PHYSICAL EXAM
[Normal] : Well developed, in no acute distress, alert and oriented to person, place and time [Normal muscle bulk without asymmetry] : normal muscle bulk without asymmetry [Facet Tenderness] : no facet tenderness [] : Motor:

## 2024-03-22 ENCOUNTER — APPOINTMENT (OUTPATIENT)
Dept: RHEUMATOLOGY | Facility: CLINIC | Age: 89
End: 2024-03-22
Payer: MEDICARE

## 2024-03-22 VITALS
BODY MASS INDEX: 25.4 KG/M2 | DIASTOLIC BLOOD PRESSURE: 60 MMHG | WEIGHT: 126 LBS | SYSTOLIC BLOOD PRESSURE: 128 MMHG | HEIGHT: 59 IN | HEART RATE: 64 BPM | OXYGEN SATURATION: 98 %

## 2024-03-22 PROCEDURE — 99214 OFFICE O/P EST MOD 30 MIN: CPT

## 2024-03-22 PROCEDURE — G2211 COMPLEX E/M VISIT ADD ON: CPT

## 2024-03-22 NOTE — HISTORY OF PRESENT ILLNESS
[___ Month(s) Ago] : [unfilled] month(s) ago [FreeTextEntry1] : doing well no inflammatory arthritis no synovitis no dactylitis no fall pain management follow up steroid epidural injection

## 2024-03-22 NOTE — PHYSICAL EXAM
[General Appearance - Alert] : alert [Sclera] : the sclera and conjunctiva were normal [Outer Ear] : the ears and nose were normal in appearance [Neck Appearance] : the appearance of the neck was normal [Heart Sounds] : normal S1 and S2 [Edema] : there was no peripheral edema [Musculoskeletal - Swelling] : no joint swelling seen [Motor Tone] : muscle strength and tone were normal [] : no rash [No Focal Deficits] : no focal deficits [Oriented To Time, Place, And Person] : oriented to person, place, and time

## 2024-03-24 LAB
ALBUMIN SERPL ELPH-MCNC: 4.6 G/DL
ALP BLD-CCNC: 84 U/L
ALT SERPL-CCNC: 33 U/L
ANION GAP SERPL CALC-SCNC: 12 MMOL/L
AST SERPL-CCNC: 18 U/L
BASOPHILS # BLD AUTO: 0.02 K/UL
BASOPHILS NFR BLD AUTO: 0.3 %
BILIRUB SERPL-MCNC: 0.2 MG/DL
BUN SERPL-MCNC: 21 MG/DL
CALCIUM SERPL-MCNC: 9.8 MG/DL
CHLORIDE SERPL-SCNC: 105 MMOL/L
CO2 SERPL-SCNC: 26 MMOL/L
CREAT SERPL-MCNC: 0.92 MG/DL
CRP SERPL-MCNC: <3 MG/L
EGFR: 60 ML/MIN/1.73M2
EOSINOPHIL # BLD AUTO: 0.06 K/UL
EOSINOPHIL NFR BLD AUTO: 0.9 %
ERYTHROCYTE [SEDIMENTATION RATE] IN BLOOD BY WESTERGREN METHOD: 26 MM/HR
GLUCOSE SERPL-MCNC: 71 MG/DL
HCT VFR BLD CALC: 38.3 %
HGB BLD-MCNC: 12.3 G/DL
IMM GRANULOCYTES NFR BLD AUTO: 0.3 %
LYMPHOCYTES # BLD AUTO: 1.77 K/UL
LYMPHOCYTES NFR BLD AUTO: 26.7 %
MAN DIFF?: NORMAL
MCHC RBC-ENTMCNC: 30.1 PG
MCHC RBC-ENTMCNC: 32.1 GM/DL
MCV RBC AUTO: 93.9 FL
MONOCYTES # BLD AUTO: 0.72 K/UL
MONOCYTES NFR BLD AUTO: 10.9 %
NEUTROPHILS # BLD AUTO: 4.04 K/UL
NEUTROPHILS NFR BLD AUTO: 60.9 %
PLATELET # BLD AUTO: 165 K/UL
POTASSIUM SERPL-SCNC: 4.5 MMOL/L
PROT SERPL-MCNC: 6.8 G/DL
RBC # BLD: 4.08 M/UL
RBC # FLD: 13.3 %
SODIUM SERPL-SCNC: 143 MMOL/L
WBC # FLD AUTO: 6.63 K/UL

## 2024-05-07 ENCOUNTER — APPOINTMENT (OUTPATIENT)
Dept: GERIATRICS | Facility: CLINIC | Age: 89
End: 2024-05-07
Payer: MEDICARE

## 2024-05-07 VITALS
HEIGHT: 59 IN | BODY MASS INDEX: 26.21 KG/M2 | RESPIRATION RATE: 16 BRPM | TEMPERATURE: 98.4 F | SYSTOLIC BLOOD PRESSURE: 114 MMHG | DIASTOLIC BLOOD PRESSURE: 56 MMHG | WEIGHT: 130 LBS | HEART RATE: 56 BPM | OXYGEN SATURATION: 99 %

## 2024-05-07 DIAGNOSIS — E55.9 VITAMIN D DEFICIENCY, UNSPECIFIED: ICD-10-CM

## 2024-05-07 PROCEDURE — G2211 COMPLEX E/M VISIT ADD ON: CPT

## 2024-05-07 PROCEDURE — 99215 OFFICE O/P EST HI 40 MIN: CPT

## 2024-05-07 RX ORDER — CALCIUM CARBONATE/VITAMIN D3 600MG-5MCG
600-10 TABLET ORAL TWICE DAILY
Qty: 180 | Refills: 3 | Status: ACTIVE | COMMUNITY
Start: 2024-05-07 | End: 1900-01-01

## 2024-05-07 NOTE — HEALTH RISK ASSESSMENT
[No falls in past year] : Patient reported no falls in the past year [0] : 2) Feeling down, depressed, or hopeless: Not at all (0) [PHQ-2 Negative - No further assessment needed] : PHQ-2 Negative - No further assessment needed [[id number]] : 0

## 2024-05-07 NOTE — ASSESSMENT
[FreeTextEntry1] : completed HCP left message for Carlos to finalize the MOLST perhaps he can come into next visit to discuss in detail stopped vitamin D seems to have misunderstood advise calcium and Vitamin D

## 2024-05-07 NOTE — HISTORY OF PRESENT ILLNESS
[FreeTextEntry1] : follow up [de-identified] : Here to discuss MOLST and HCP Apparently never designated HCP completed today - primary is  and secondary is son Carlos left vm for son to discuss  No clinical findings to suggest RA activity. one year longitudinal follow up no inflammatory symptoms.  Off MTX end of 2023 Marck

## 2024-05-31 ENCOUNTER — APPOINTMENT (OUTPATIENT)
Dept: PAIN MANAGEMENT | Facility: CLINIC | Age: 89
End: 2024-05-31
Payer: MEDICARE

## 2024-05-31 VITALS
WEIGHT: 130 LBS | BODY MASS INDEX: 26.21 KG/M2 | DIASTOLIC BLOOD PRESSURE: 74 MMHG | HEIGHT: 59 IN | SYSTOLIC BLOOD PRESSURE: 142 MMHG

## 2024-05-31 DIAGNOSIS — M48.02 SPINAL STENOSIS, CERVICAL REGION: ICD-10-CM

## 2024-05-31 DIAGNOSIS — M47.817 SPONDYLOSIS W/OUT MYELOPATHY OR RADICULOPATHY, LUMBOSACRAL REGION: ICD-10-CM

## 2024-05-31 DIAGNOSIS — M79.2 NEURALGIA AND NEURITIS, UNSPECIFIED: ICD-10-CM

## 2024-05-31 DIAGNOSIS — G89.4 CHRONIC PAIN SYNDROME: ICD-10-CM

## 2024-05-31 PROCEDURE — G2211 COMPLEX E/M VISIT ADD ON: CPT

## 2024-05-31 PROCEDURE — 99214 OFFICE O/P EST MOD 30 MIN: CPT

## 2024-05-31 NOTE — HISTORY OF PRESENT ILLNESS
[7] : a minimum pain level of 7/10 [3] : a maximum pain level of 3/10 [1] : 3. What number best describes how, during the past week, pain has interfered with your general activity? 1/10 pain [FreeTextEntry1] : Interval Note:  Reports back pain radiating buttock  Pain is so bad that patient finds it difficult to perform adls and ambulate. Patient also complains of right thumb pain at times.  Improved with lidocaine patch. denies any worsening numbness, weakness, bowel/bladder dysfunction. Utilizes voltaren for her thumb pain, able to function with minimal discomfort.  Continues OT.     HPI    Ms. LALIT MCKENNA is a 89 year F with pmhx osteoporosis (was on Prolia), HTN, RA c/o chronic right sided LBP that radiates posteriorly to knee. hx of falls and memory issues  resolved since stopping Tramadol, Cymbalta, and Celebrex. Most recent fall 2021 and hospitalization for broken ribs, elbow, and broken shoulder. Numbness to the bottom of right foot in am the resolved on rising. Denies any additional weakness, numbness, bowel/bladder dysfunction.  Also c/o right thumb pain that began a few months ago. Exacerbated more lately by knitting.    Previous and current pain medications/doses/effects: Tylenol   Tramadol, Cymbalta, Celebrex (stopped 6 months ago all dues to dizziness)   Previous Pain Treatments: Physical therapy x 3 months   Previous Pain Injections:   L4-5 interlaminar epidural steroid injection 11/8/23   L4-5 interlaminar epidural steroid injection 7/5/23  LEFT AND RIGHT  L4-sacral ala medial branch (2 joints, 3 nerves on each side) radiofrequency ablation 6/1/23 BILATERAL L4-sacral ala diagnostic medial branch block (2 joints, 3 nerves on each side) 10/19/22 BILATERAL L4-sacral ala diagnostic medial branch block (2 joints, 3 nerves on each side) 9/30/22 Dr Aguirre ([place])- 2021   Previous Diagnostic Studies/Images:  MRI CS 4/23  Cervical lordosis maintained. Cervical vertebral body heights are maintained. Mild C3-C4 anterolisthesis.. Cervical vertebral body bone marrow signal within normal limits. There are multilevel degenerative endplate changes with osteophyte formation, intervertebral disc space narrowing/desiccation, Schmorl's nodes and endplate irregularity. . No suspicious expansile or destructive osseous lesion identified. Paraspinal soft tissues are within normal limits. Visualized lung apices are unremarkable. Visualized vertebral artery flow voids are preserved. No abnormal cord signal identified. No significant periarticular or paraspinal soft tissue edema identified.   There are multilevel findings as follows:     Craniocervical junction unremarkable.   C2-C3: No significant canal or foraminal stenosis.   C3-C4: Posterior osteophytic ridging with disc bulging and bilateral uncovertebral/facet hypertrophy contributing to no significant canal stenosis and mild bilateral foraminal stenosis.   C4-C5: Posterior osteophytic ridging with disc bulging and bilateral uncovertebral/facet hypertrophy contributing to no significant canal stenosis and moderate bilateral foraminal stenosis.    C5-C6: Posterior osteophytic ridging with disc bulging and bilateral uncovertebral/facet hypertrophy contributing to no significant canal stenosis and moderate to severe bilateral foraminal stenosis.    C6-C7: Posterior osteophytic ridging with disc bulging and bilateral uncovertebral/facet hypertrophy contributing to no significant canal stenosis and moderate bilateral foraminal stenosis.   C7-T1: No significant canal or foraminal stenosis.     IMPRESSION:   Multilevel cervical spondylitic changes as detailed above most notable for moderate to severe bilateral foraminal stenosis at C4-C5, C5-C6 and C6-C7. No significant canal stenosis of the cervical spine.   MRI  LS 8/22  There is grade 1 anterolisthesis of L5-S1. There is straightening of the normal lumbar lordosis. There is S-shaped curvature of the lumbar spine. The vertebral body heights are maintained. The vertebral body marrow signal is appropriate for the patient's stated age.   The conus medullaris ends at L1.   L1/2 level: Small broad-based posterior disc bulge and mild facet arthropathy without central canal and neural foraminal narrowing.   L2/3  level: Broad-based posterior disc bulge and moderate bilateral facet arthropathy result in mild bilateral neural foraminal narrowing and moderate central canal narrowing. There is impingement of the bilateral descending [id number] roots within the right and left lateral recesses.   L3/4 level: Broad-based posterior disc bulge and moderate bilateral facet arthropathy result in severe central canal narrowing, mild left neural foraminal narrowing and moderate right neural charan inal narrowing. There is impingement of the descending right L4 nerve root within the right lateral recess.   L4/5 level: Broad-based posterior disc bulge and moderate bilateral facet arthropathy (right greater than left) result in mild central canal narrowing, moderate to severe right neural foraminal narrowing and moderate left neural foraminal narrowing. There is impingement of the descending right L5 nerve roots within the lateral recess.   L5/S1 level: Grade 1 anterolisthesis. Broad-based posterior disc bulge and severe bilateral facet arthropathy (right greater than left) result in moderate central canal narrowing, moderate right neural foraminal narrowing and mild left neural foraminal narrowing. There is impingement of the bilateral descending S1 nerve roots within the right and left lateral recesses.     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 There is also moderate central canal narrowing at L5-S1 in addition to moderate right neural foraminal narrowing and mild left neural foraminal narrowing. Additional varying degrees of central canal and neural foraminal narrowing, as above.         [FreeTextEntry2] : 3

## 2024-05-31 NOTE — ASSESSMENT
[FreeTextEntry1] : >> Imaging and Other Studies  I personally reviewed the relevant imaging. Discussed and explained to patient the likely source of pathology and pain. Questions answered. MRI  may consider PT vs intervention pending eval   >> Therapy and Other Modalities  OT for hand and elbow   start PT - referrla provided TENS trial  >> Medications  lidocaine patch to hand  acetaminophen 650mg q8h prn pain (caution <3g daily)  trial voltaren prn pain  >> Interventions  Significant component of back and leg pain likely secondary to lumbar spinal stenosis demonstrated on MRI LS. sp L4-5 interlaminar epidural steroid injection with significant improvement  given efficacy of previous intervention that is >80% improvement in pain and improved ability to perform adls, and return of pain despite conservative treatment, sp L4-5 interlaminar epidural steroid injection with significant improvement in back and buttock pain   given efficacy of previous intervention that is >80% improvement in pain and improved ability to perform adls, and return of pain despite conservative treatment, will schedule repeat  L4-5 interlaminar epidural steroid injection r/b/a discussed  Significant component of axial back pain secondary to lumbar spondylosis and facet arthropathy demonstrated on MRI LS. Pain refractory to conservative treatments. sp BILATERAL L4-sacral ala diagnostic medial branch block (2 joints, 3 nerves on each side) with 80% sustained relief of axial back pain  sp repeat BILATERAL L4-sacral ala diagnostic medial branch block (2 joints, 3 nerves on each side) with 100% sustained improvement  Given significant relief from diagnostic lumbar medial branch block of >80%, and significant improvement in function (as measured by MITCHELL) and continued lumbar facet arthropathy pain (demonstrated on imaging) and axial back pain, sp LEFT AND RIGHT L4-sacral ala medial branch (2 joints, 3 nerves on each side) radiofrequency ablation with significant improvement  >> Consults  na  >> Discussion of Risks/Benefits/Alternatives   >Regarding any scheduled procedures:  I have discussed in detail with the patient that any interventional pain procedure is associated with potential risks. The procedure may include an injection of steroids and potentially other medications (local anesthetic and normal saline) into the epidural space or surrounding tissue of the spine. There are significant risks of this procedure which include and are not limited to infection, bleeding, worsening pain, dural puncture leading to postdural puncture headache, nerve damage, spinal cord injury, paralysis, stroke, and death.  There is a chance that the procedure does not improve their pain.  There are risks associated with the steroid being absorbed into the body systemically. These include dysphoria, difficulty sleeping, mood swings and personality changes. Premenopausal women may notice an irregularity in her menstrual cycle for 2-3 months following the injection. Steroids can specifically affect patients with hypertension, diabetes, and peptic ulcers. The procedure may cause a temporary increase in blood pressure and blood pressure, and may adversely affect a peptic ulcer. Other, more rare complications, include avascular necrosis of joints, glaucoma and worsening of osteoporosis.  I have discussed the risks of the procedure at length with the patient, and the potential benefits of pain relief. I have offered alternatives to the procedure. All questions were answered.  The patient expressed understanding and wishes to proceed with the procedure.   > Longitudinal management of Complex Painful condition   The patient is being managed for a complex condition that requires ongoing management.  The nature of this condition demands nuanced approach to treatment.  The seriousness of the condition necessitates an in-depth and focused approach to management and coordination with other healthcare professionals.    This visit involves intricate evaluation and management of the patient's condition.  The complexity of the visit was due to the need for detailed assessment of the current state, consideration of potential complications and a careful balancing of treatment options to management the chronic condition effectively.   As detailed above, the patient has a chronic significant painful condition that requires regular and detailed management.  The condition's impact on the patient's quality of life and health is substantial and necessitates a comprehensive and tailored approach   >> Conclusion   There were no barriers to communication. Informed patient that I would be available for any additional questions. Patient was instructed to call with any worsening symptoms including severe pain, new numbness/weakness, or changes in the bowel/bladder function. Discussed role of nsaids in pain management and all relevant risks, if patient is continuing to require after 4 weeks the patient should f/u for alternative treatment. Instructed patient to maintain pain diary to monitor pain level, mobility, and function.

## 2024-06-11 ENCOUNTER — APPOINTMENT (OUTPATIENT)
Dept: GERIATRICS | Facility: CLINIC | Age: 89
End: 2024-06-11
Payer: MEDICARE

## 2024-06-11 DIAGNOSIS — N95.2 POSTMENOPAUSAL ATROPHIC VAGINITIS: ICD-10-CM

## 2024-06-11 DIAGNOSIS — N32.81 OVERACTIVE BLADDER: ICD-10-CM

## 2024-06-11 PROCEDURE — 99443: CPT | Mod: 93

## 2024-06-11 PROCEDURE — G2211 COMPLEX E/M VISIT ADD ON: CPT | Mod: NC

## 2024-06-11 RX ORDER — ESTRADIOL 0.1 MG/G
0.1 CREAM VAGINAL
Qty: 1 | Refills: 3 | Status: ACTIVE | COMMUNITY
Start: 2024-06-11 | End: 1900-01-01

## 2024-06-11 NOTE — HISTORY OF PRESENT ILLNESS
[FreeTextEntry1] : 89 year old female presenting for annual Had been living in [place] 30 years [place]oved to [place] [facility]  Fell in [place] and hurt her L shoulder  She required surgery also was later found to have elbow fracture no current shoulder issues but feels L arm is weak due to elbow injury  concerned about estradiol suppository hard to use applicator wanting to talk about alternative forms of treatment       [No falls in past year] : Patient reported no falls in the past year [Completely Independent] : Completely independent. [Smoke Detector] : smoke detector [Carbon Monoxide Detector] : carbon monoxide detector [NO] : No [0] : 2) Feeling down, depressed, or hopeless: Not at all (0) [PHQ-2 Negative - No further assessment needed] : PHQ-2 Negative - No further assessment needed [[id number]] : 0

## 2024-06-11 NOTE — ASSESSMENT
[FreeTextEntry1] : will trial estrogen vaginal cream for now if no improvement can consider patch form patient aware she might call [employer] to file complaint applicator not effective for topical agent

## 2024-06-11 NOTE — REASON FOR VISIT
[Home] : at home, [unfilled] , at the time of the visit. [Medical Office: ([facility])___] : at the medical office located in  [Verbal consent obtained from patient] : the patient, [unfilled] [Follow-Up] : a follow-up visit

## 2024-06-18 ENCOUNTER — APPOINTMENT (OUTPATIENT)
Dept: PAIN MANAGEMENT | Facility: CLINIC | Age: 89
End: 2024-06-18
Payer: MEDICARE

## 2024-06-18 VITALS
DIASTOLIC BLOOD PRESSURE: 75 MMHG | OXYGEN SATURATION: 95 % | HEART RATE: 57 BPM | SYSTOLIC BLOOD PRESSURE: 174 MMHG | RESPIRATION RATE: 15 BRPM

## 2024-06-18 DIAGNOSIS — M54.16 RADICULOPATHY, LUMBAR REGION: ICD-10-CM

## 2024-06-18 DIAGNOSIS — M48.061 SPINAL STENOSIS, LUMBAR REGION WITHOUT NEUROGENIC CLAUDICATION: ICD-10-CM

## 2024-06-18 PROCEDURE — 62323 NJX INTERLAMINAR LMBR/SAC: CPT

## 2024-06-18 PROCEDURE — 99214 OFFICE O/P EST MOD 30 MIN: CPT | Mod: 25

## 2024-06-18 RX ADMIN — IOHEXOL 0 MG/ML: 180 INJECTION INTRAVENOUS at 00:00

## 2024-06-18 RX ADMIN — LIDOCAINE HYDROCHLORIDE %: 10 INJECTION, SOLUTION INFILTRATION; PERINEURAL at 00:00

## 2024-06-18 RX ADMIN — TRIAMCINOLONE ACETONIDE 0 MG/ML: 80 INJECTION, SUSPENSION INTRA-ARTICULAR; INTRAMUSCULAR at 00:00

## 2024-06-21 ENCOUNTER — APPOINTMENT (OUTPATIENT)
Dept: GERIATRICS | Facility: CLINIC | Age: 89
End: 2024-06-21
Payer: MEDICARE

## 2024-06-21 VITALS
HEART RATE: 68 BPM | OXYGEN SATURATION: 99 % | SYSTOLIC BLOOD PRESSURE: 132 MMHG | DIASTOLIC BLOOD PRESSURE: 70 MMHG | BODY MASS INDEX: 26.26 KG/M2 | WEIGHT: 130 LBS

## 2024-06-21 DIAGNOSIS — M06.9 RHEUMATOID ARTHRITIS, UNSPECIFIED: ICD-10-CM

## 2024-06-21 DIAGNOSIS — M81.0 AGE-RELATED OSTEOPOROSIS W/OUT CURRENT PATHOLOGICAL FRACTURE: ICD-10-CM

## 2024-06-21 DIAGNOSIS — Z71.89 OTHER SPECIFIED COUNSELING: ICD-10-CM

## 2024-06-21 DIAGNOSIS — M54.9 DORSALGIA, UNSPECIFIED: ICD-10-CM

## 2024-06-21 DIAGNOSIS — I10 ESSENTIAL (PRIMARY) HYPERTENSION: ICD-10-CM

## 2024-06-21 DIAGNOSIS — G89.29 DORSALGIA, UNSPECIFIED: ICD-10-CM

## 2024-06-21 PROCEDURE — G2211 COMPLEX E/M VISIT ADD ON: CPT

## 2024-06-21 PROCEDURE — 99215 OFFICE O/P EST HI 40 MIN: CPT

## 2024-06-24 PROBLEM — M81.0 AGE RELATED OSTEOPOROSIS: Status: ACTIVE | Noted: 2022-07-11

## 2024-06-24 PROBLEM — M54.9 CHRONIC BACK PAIN: Status: ACTIVE | Noted: 2022-08-10

## 2024-06-24 PROBLEM — M06.9 RHEUMATOID ARTHRITIS: Status: ACTIVE | Noted: 2022-07-11

## 2024-06-24 PROBLEM — Z71.89 ADVANCED DIRECTIVES, COUNSELING/DISCUSSION: Status: ACTIVE | Noted: 2024-06-24

## 2024-06-24 PROBLEM — I10 BENIGN ESSENTIAL HTN: Status: ACTIVE | Noted: 2022-07-11

## 2024-06-24 NOTE — ASSESSMENT
[FreeTextEntry1] : finalized MOLST HCP done last visit children are aware of her wishes  likely has white coat htn now on estradiol cream

## 2024-06-24 NOTE — HISTORY OF PRESENT ILLNESS
[No falls in past year] : Patient reported no falls in the past year [0] : 2) Feeling down, depressed, or hopeless: Not at all (0) [FreeTextEntry1] : 89 year old female presenting for annual Had been living in [place] 30 years [place]oved to [place] [facility]  Fell in [place] and hurt her L shoulder  She required surgery also was later found to have elbow fracture no current shoulder issues but feels L arm is weak due to elbow injury  Here to finalize MOLST with  daughter and son      [Completely Independent] : Completely independent. [Full assistance needed] : Assistance needed managing medications [] : Assistance needed managing finances. [Walker] : walker [Smoke Detector] : smoke detector [Carbon Monoxide Detector] : carbon monoxide detector [NO] : No [PHQ-2 Negative - No further assessment needed] : PHQ-2 Negative - No further assessment needed [[id number]] : 0

## 2024-08-06 ENCOUNTER — APPOINTMENT (OUTPATIENT)
Dept: PAIN MANAGEMENT | Facility: CLINIC | Age: 89
End: 2024-08-06

## 2024-08-26 ENCOUNTER — APPOINTMENT (OUTPATIENT)
Dept: GERIATRICS | Facility: CLINIC | Age: 89
End: 2024-08-26
Payer: MEDICARE

## 2024-08-26 VITALS
DIASTOLIC BLOOD PRESSURE: 78 MMHG | HEART RATE: 51 BPM | BODY MASS INDEX: 26.81 KG/M2 | SYSTOLIC BLOOD PRESSURE: 135 MMHG | WEIGHT: 133 LBS | OXYGEN SATURATION: 99 % | TEMPERATURE: 97.3 F | HEIGHT: 59 IN

## 2024-08-26 DIAGNOSIS — M81.0 AGE-RELATED OSTEOPOROSIS W/OUT CURRENT PATHOLOGICAL FRACTURE: ICD-10-CM

## 2024-08-26 DIAGNOSIS — J30.2 OTHER SEASONAL ALLERGIC RHINITIS: ICD-10-CM

## 2024-08-26 DIAGNOSIS — N32.81 OVERACTIVE BLADDER: ICD-10-CM

## 2024-08-26 DIAGNOSIS — I10 ESSENTIAL (PRIMARY) HYPERTENSION: ICD-10-CM

## 2024-08-26 PROCEDURE — 99215 OFFICE O/P EST HI 40 MIN: CPT

## 2024-08-26 PROCEDURE — G2211 COMPLEX E/M VISIT ADD ON: CPT

## 2024-08-26 RX ORDER — AZELASTINE HYDROCHLORIDE 137 UG/1
137 SPRAY, METERED NASAL TWICE DAILY
Qty: 1 | Refills: 5 | Status: ACTIVE | COMMUNITY
Start: 2024-08-26 | End: 1900-01-01

## 2024-08-26 NOTE — ASSESSMENT
[FreeTextEntry1] : likely has white coat htn now on estradiol cream working with pain management for back will trial azelastine for nasal congestion

## 2024-08-26 NOTE — HISTORY OF PRESENT ILLNESS
[No falls in past year] : Patient reported no falls in the past year [Completely Independent] : Completely independent. [Full assistance needed] : Assistance needed managing medications [] : Assistance needed managing finances. [Walker] : walker [Smoke Detector] : smoke detector [Carbon Monoxide Detector] : carbon monoxide detector [NO] : No [0] : 2) Feeling down, depressed, or hopeless: Not at all (0) [PHQ-2 Negative - No further assessment needed] : PHQ-2 Negative - No further assessment needed [FreeTextEntry1] : 89 year old female presenting for annual Had been living in [place] 30 years [place]oved to [place] [facility]  Fell [place] and hurt her L shoulder  She required surgery also was later found to have elbow fracture no current shoulder issues but feels L arm is weak due to elbow injury        [[id number]] : 0

## 2024-08-30 ENCOUNTER — APPOINTMENT (OUTPATIENT)
Dept: PAIN MANAGEMENT | Facility: CLINIC | Age: 89
End: 2024-08-30

## 2024-08-30 VITALS
HEIGHT: 59 IN | SYSTOLIC BLOOD PRESSURE: 152 MMHG | BODY MASS INDEX: 26.81 KG/M2 | DIASTOLIC BLOOD PRESSURE: 73 MMHG | WEIGHT: 133 LBS

## 2024-08-30 DIAGNOSIS — G89.4 CHRONIC PAIN SYNDROME: ICD-10-CM

## 2024-08-30 DIAGNOSIS — M79.2 NEURALGIA AND NEURITIS, UNSPECIFIED: ICD-10-CM

## 2024-08-30 DIAGNOSIS — M47.817 SPONDYLOSIS W/OUT MYELOPATHY OR RADICULOPATHY, LUMBOSACRAL REGION: ICD-10-CM

## 2024-08-30 DIAGNOSIS — M48.061 SPINAL STENOSIS, LUMBAR REGION WITHOUT NEUROGENIC CLAUDICATION: ICD-10-CM

## 2024-08-30 DIAGNOSIS — M48.02 SPINAL STENOSIS, CERVICAL REGION: ICD-10-CM

## 2024-08-30 DIAGNOSIS — M54.16 RADICULOPATHY, LUMBAR REGION: ICD-10-CM

## 2024-08-30 PROCEDURE — G2211 COMPLEX E/M VISIT ADD ON: CPT

## 2024-08-30 PROCEDURE — 99214 OFFICE O/P EST MOD 30 MIN: CPT

## 2024-08-30 NOTE — ASSESSMENT
[FreeTextEntry1] : >> Imaging and Other Studies  I personally reviewed the relevant imaging. Discussed and explained to patient the likely source of pathology and pain. Questions answered. MRI  may consider PT vs intervention pending eval   >> Therapy and Other Modalities  OT for hand and elbow   start PT - referrla provided TENS trial  >> Medications  lidocaine patch to hand  acetaminophen 650mg q8h prn pain (caution <3g daily)  trial voltaren prn pain  >> Interventions  Significant component of back and leg pain likely secondary to lumbar spinal stenosis demonstrated on MRI LS. sp L4-5 interlaminar epidural steroid injection with significant improvement  given efficacy of previous intervention that is >80% improvement in pain and improved ability to perform adls, and return of pain despite conservative treatment, sp L4-5 interlaminar epidural steroid injection with significant improvement in back and buttock pain   given efficacy of previous intervention that is >80% improvement in pain and improved ability to perform adls, and return of pain despite conservative treatment, will schedule repeat  L4-5 interlaminar epidural steroid injection r/b/a discussed  Significant component of axial back pain secondary to lumbar spondylosis and facet arthropathy demonstrated on MRI LS. Pain refractory to conservative treatments. sp BILATERAL L4-sacral ala diagnostic medial branch block (2 joints, 3 nerves on each side) with 80% sustained relief of axial back pain  sp repeat BILATERAL L4-sacral ala diagnostic medial branch block (2 joints, 3 nerves on each side) with 100% sustained improvement  Given significant relief from diagnostic lumbar medial branch block of >80%, and significant improvement in function (as measured by MITCHELL) and continued lumbar facet arthropathy pain (demonstrated on imaging) and axial back pain, sp LEFT AND RIGHT L4-sacral ala medial branch (2 joints, 3 nerves on each side) radiofrequency ablation with significant improvement  may consider repeat RFA  >> Consults  na  >> Discussion of Risks/Benefits/Alternatives   >Regarding any scheduled procedures:  I have discussed in detail with the patient that any interventional pain procedure is associated with potential risks. The procedure may include an injection of steroids and potentially other medications (local anesthetic and normal saline) into the epidural space or surrounding tissue of the spine. There are significant risks of this procedure which include and are not limited to infection, bleeding, worsening pain, dural puncture leading to postdural puncture headache, nerve damage, spinal cord injury, paralysis, stroke, and death.  There is a chance that the procedure does not improve their pain.  There are risks associated with the steroid being absorbed into the body systemically. These include dysphoria, difficulty sleeping, mood swings and personality changes. Premenopausal women may notice an irregularity in her menstrual cycle for 2-3 months following the injection. Steroids can specifically affect patients with hypertension, diabetes, and peptic ulcers. The procedure may cause a temporary increase in blood pressure and blood pressure, and may adversely affect a peptic ulcer. Other, more rare complications, include avascular necrosis of joints, glaucoma and worsening of osteoporosis.  I have discussed the risks of the procedure at length with the patient, and the potential benefits of pain relief. I have offered alternatives to the procedure. All questions were answered.  The patient expressed understanding and wishes to proceed with the procedure.   > Longitudinal management of Complex Painful condition   The patient is being managed for a complex condition that requires ongoing management.  The nature of this condition demands nuanced approach to treatment.  The seriousness of the condition necessitates an in-depth and focused approach to management and coordination with other healthcare professionals.    This visit involves intricate evaluation and management of the patient's condition.  The complexity of the visit was due to the need for detailed assessment of the current state, consideration of potential complications and a careful balancing of treatment options to management the chronic condition effectively.   As detailed above, the patient has a chronic significant painful condition that requires regular and detailed management.  The condition's impact on the patient's quality of life and health is substantial and necessitates a comprehensive and tailored approach   >> Conclusion   There were no barriers to communication. Informed patient that I would be available for any additional questions. Patient was instructed to call with any worsening symptoms including severe pain, new numbness/weakness, or changes in the bowel/bladder function. Discussed role of nsaids in pain management and all relevant risks, if patient is continuing to require after 4 weeks the patient should f/u for alternative treatment. Instructed patient to maintain pain diary to monitor pain level, mobility, and function.

## 2024-08-30 NOTE — HISTORY OF PRESENT ILLNESS
[7] : a minimum pain level of 7/10 [3] : a maximum pain level of 3/10 [1] : 3. What number best describes how, during the past week, pain has interfered with your general activity? 1/10 pain [FreeTextEntry1] : Interval Note:  sp  L4-5 interlaminar epidural steroid injection 6/18/24 with improvement in back and leg pain however she complains of bilateral back pain over the last few days, improves mildly with tylenol.  Reports back pain radiating buttock Pain is so bad that patient finds it difficult to perform adls and ambulate.ch. denies any worsening numbness, weakness, bowel/bladder dysfunction. Utilizes voltaren for her thumb pain, able to function with minimal discomfort.  Continues OT.     HPI    Ms. LALIT MCKENNA is a 89 year F with pmhx osteoporosis (was on Prolia), HTN, RA c/o chronic right sided LBP that radiates posteriorly to knee. hx of falls and memory issues  resolved since stopping Tramadol, Cymbalta, and Celebrex. Most recent fall 2021 and hospitalization for broken ribs, elbow, and broken shoulder. Numbness to the bottom of right foot in am the resolved on rising. Denies any additional weakness, numbness, bowel/bladder dysfunction.  Also c/o right thumb pain that began a few months ago. Exacerbated more lately by knitting.    Previous and current pain medications/doses/effects: Tylenol   Tramadol, Cymbalta, Celebrex (stopped 6 months ago all dues to dizziness)   Previous Pain Treatments: Physical therapy x 3 months   Previous Pain Injections:    L4-5 interlaminar epidural steroid injection 6/18/24  L4-5 interlaminar epidural steroid injection 11/8/23   L4-5 interlaminar epidural steroid injection 7/5/23  LEFT AND RIGHT  L4-sacral ala medial branch (2 joints, 3 nerves on each side) radiofrequency ablation 6/1/23 BILATERAL L4-sacral ala diagnostic medial branch block (2 joints, 3 nerves on each side) 10/19/22 BILATERAL L4-sacral ala diagnostic medial branch block (2 joints, 3 nerves on each side) 9/30/22 Dr Aguirre ([place])- 2021   Previous Diagnostic Studies/Images:  MRI CS 4/23  Cervical lordosis maintained. Cervical vertebral body heights are maintained. Mild C3-C4 anterolisthesis.. Cervical vertebral body bone marrow signal within normal limits. There are multilevel degenerative endplate changes with osteophyte formation, intervertebral disc space narrowing/desiccation, Schmorl's nodes and endplate irregularity. . No suspicious expansile or destructive osseous lesion identified. Paraspinal soft tissues are within normal limits. Visualized lung apices are unremarkable. Visualized vertebral artery flow voids are preserved. No abnormal cord signal identified. No significant periarticular or paraspinal soft tissue edema identified.   There are multilevel findings as follows:     Craniocervical junction unremarkable.   C2-C3: No significant canal or foraminal stenosis.   C3-C4: Posterior osteophytic ridging with disc bulging and bilateral uncovertebral/facet hypertrophy contributing to no significant canal stenosis and mild bilateral foraminal stenosis.   C4-C5: Posterior osteophytic ridging with disc bulging and bilateral uncovertebral/facet hypertrophy contributing to no significant canal stenosis and moderate bilateral foraminal stenosis.    C5-C6: Posterior osteophytic ridging with disc bulging and bilateral uncovertebral/facet hypertrophy contributing to no significant canal stenosis and moderate to severe bilateral foraminal stenosis.    C6-C7: Posterior osteophytic ridging with disc bulging and bilateral uncovertebral/facet hypertrophy contributing to no significant canal stenosis and moderate bilateral foraminal stenosis.   C7-T1: No significant canal or foraminal stenosis.     IMPRESSION:   Multilevel cervical spondylitic changes as detailed above most notable for moderate to severe bilateral foraminal stenosis at C4-C5, C5-C6 and C6-C7. No significant canal stenosis of the cervical spine.   MRI  LS 8/22  There is grade 1 anterolisthesis of L5-S1. There is straightening of the normal lumbar lordosis. There is S-shaped curvature of the lumbar spine. The vertebral body heights are maintained. The vertebral body marrow signal is appropriate for the patient's stated age.   The conus medullaris ends at L1.   L1/2 level: Small broad-based posterior disc bulge and mild facet arthropathy without central canal and neural foraminal narrowing.   L2/3  level: Broad-based posterior disc bulge and moderate bilateral facet arthropathy result in mild bilateral neural foraminal narrowing and moderate central canal narrowing. There is impingement of the bilateral descending [id number] roots within the right and left lateral recesses.   L3/4 level: Broad-based posterior disc bulge and moderate bilateral facet arthropathy result in severe central canal narrowing, mild left neural foraminal narrowing and moderate right neural charan inal narrowing. There is impingement of the descending right L4 nerve root within the right lateral recess.   L4/5 level: Broad-based posterior disc bulge and moderate bilateral facet arthropathy (right greater than left) result in mild central canal narrowing, moderate to severe right neural foraminal narrowing and moderate left neural foraminal narrowing. There is impingement of the descending right L5 nerve roots within the lateral recess.   L5/S1 level: Grade 1 anterolisthesis. Broad-based posterior disc bulge and severe bilateral facet arthropathy (right greater than left) result in moderate central canal narrowing, moderate right neural foraminal narrowing and mild left neural foraminal narrowing. There is impingement of the bilateral descending S1 nerve roots within the right and left lateral recesses.     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 There is also moderate central canal narrowing at L5-S1 in addition to moderate right neural foraminal narrowing and mild left neural foraminal narrowing. Additional varying degrees of central canal and neural foraminal narrowing, as above.         [FreeTextEntry2] : 3

## 2024-09-11 ENCOUNTER — APPOINTMENT (OUTPATIENT)
Dept: PAIN MANAGEMENT | Facility: CLINIC | Age: 89
End: 2024-09-11
Payer: MEDICARE

## 2024-09-11 VITALS
RESPIRATION RATE: 16 BRPM | OXYGEN SATURATION: 98 % | SYSTOLIC BLOOD PRESSURE: 179 MMHG | DIASTOLIC BLOOD PRESSURE: 66 MMHG | HEART RATE: 59 BPM

## 2024-09-11 DIAGNOSIS — M54.16 RADICULOPATHY, LUMBAR REGION: ICD-10-CM

## 2024-09-11 DIAGNOSIS — M48.02 SPINAL STENOSIS, CERVICAL REGION: ICD-10-CM

## 2024-09-11 PROCEDURE — 62323 NJX INTERLAMINAR LMBR/SAC: CPT

## 2024-09-11 RX ADMIN — LIDOCAINE HYDROCHLORIDE %: 10 INJECTION, SOLUTION INFILTRATION; PERINEURAL at 00:00

## 2024-09-11 RX ADMIN — IOHEXOL 0 MG/ML: 180 INJECTION INTRAVENOUS at 00:00

## 2024-09-11 RX ADMIN — TRIAMCINOLONE ACETONIDE 0 MG/ML: 80 INJECTION, SUSPENSION INTRA-ARTICULAR; INTRAMUSCULAR at 00:00

## 2024-09-11 NOTE — PROCEDURE
[FreeTextEntry1] : INTERLAMINAR EPIDURAL STEROID INJECTION  The patient was placed in the prone position on the fluoroscopic table with a pillow under the abdomen.  Routine monitors were applied.  A surgical timeout was performed.  The back was prepped and draped in the usual sterile fashion and sterile technique was adhered to throughout the entire procedure.  The [L3-4] was identified under fluroscopic guidance.  The vertebral body end plates were aligned and the spinous process was midline between the lateral processes.  The skin and subcutaneous tissues were infiltrated with [1% Lidocaine].  After adequate local anesthesia a ["20g Tuohy"] needle was inserted at [L3-4]   and aimed towards the affected side.    Using a loss of resistance to air technique the epidural space was identified.  After negative aspiration for heme and CSF, 1cc Omnipaque N180 contrast dye was injected.  Epidural spread of contrast was confirmed in the AP and lateral views.  The patient was injected with a mixture of 80mg kenalog with 1cc lidocaine 1% and 2cc of PF normal saline in incremental amounts.  The patient tolerated the procedure well.  There was no neurological deficit post procedure.  The patient went to recovery room in stable condition.  Discharge instructions were given to the patient.

## 2024-09-24 ENCOUNTER — APPOINTMENT (OUTPATIENT)
Dept: PAIN MANAGEMENT | Facility: CLINIC | Age: 89
End: 2024-09-24
Payer: MEDICARE

## 2024-09-24 VITALS
DIASTOLIC BLOOD PRESSURE: 72 MMHG | WEIGHT: 133 LBS | BODY MASS INDEX: 26.81 KG/M2 | SYSTOLIC BLOOD PRESSURE: 122 MMHG | HEIGHT: 59 IN | HEART RATE: 62 BPM

## 2024-09-24 DIAGNOSIS — M54.16 RADICULOPATHY, LUMBAR REGION: ICD-10-CM

## 2024-09-24 DIAGNOSIS — M48.02 SPINAL STENOSIS, CERVICAL REGION: ICD-10-CM

## 2024-09-24 DIAGNOSIS — M47.817 SPONDYLOSIS W/OUT MYELOPATHY OR RADICULOPATHY, LUMBOSACRAL REGION: ICD-10-CM

## 2024-09-24 DIAGNOSIS — M48.061 SPINAL STENOSIS, LUMBAR REGION WITHOUT NEUROGENIC CLAUDICATION: ICD-10-CM

## 2024-09-24 DIAGNOSIS — G89.4 CHRONIC PAIN SYNDROME: ICD-10-CM

## 2024-09-24 DIAGNOSIS — M79.2 NEURALGIA AND NEURITIS, UNSPECIFIED: ICD-10-CM

## 2024-09-24 PROCEDURE — 99214 OFFICE O/P EST MOD 30 MIN: CPT

## 2024-09-24 PROCEDURE — G2211 COMPLEX E/M VISIT ADD ON: CPT

## 2024-09-24 RX ORDER — GABAPENTIN 100 MG/1
100 CAPSULE ORAL
Qty: 90 | Refills: 1 | Status: ACTIVE | COMMUNITY
Start: 2024-09-24 | End: 1900-01-01

## 2024-09-24 NOTE — HISTORY OF PRESENT ILLNESS
[7] : a minimum pain level of 7/10 [3] : a maximum pain level of 3/10 [1] : 3. What number best describes how, during the past week, pain has interfered with your general activity? 1/10 pain [FreeTextEntry1] : Interval Note:   sp  L4-5 interlaminar epidural steroid injection 9/11/24 with improvement in back and leg pain Doing well.  But complains of neuropathy in right foot worse at night.  Able to perform adls.   Utilizes voltaren for her thumb pain, able to function with minimal discomfort.  Continues OT.     HPI    Ms. LALIT MCKENNA is a 89 year F with pmhx osteoporosis (was on Prolia), HTN, RA c/o chronic right sided LBP that radiates posteriorly to knee. hx of falls and memory issues  resolved since stopping Tramadol, Cymbalta, and Celebrex. Most recent fall 2021 and hospitalization for broken ribs, elbow, and broken shoulder. Numbness to the bottom of right foot in am the resolved on rising. Denies any additional weakness, numbness, bowel/bladder dysfunction.  Also c/o right thumb pain that began a few months ago. Exacerbated more lately by knitting.    Previous and current pain medications/doses/effects: Tylenol   Tramadol, Cymbalta, Celebrex (stopped 6 months ago all dues to dizziness)   Previous Pain Treatments: Physical therapy x 3 months   Previous Pain Injections:   L4-5 interlaminar epidural steroid injection 9/11/24  L4-5 interlaminar epidural steroid injection 6/18/24  L4-5 interlaminar epidural steroid injection 11/8/23   L4-5 interlaminar epidural steroid injection 7/5/23  LEFT AND RIGHT  L4-sacral ala medial branch (2 joints, 3 nerves on each side) radiofrequency ablation 6/1/23 BILATERAL L4-sacral ala diagnostic medial branch block (2 joints, 3 nerves on each side) 10/19/22 BILATERAL L4-sacral ala diagnostic medial branch block (2 joints, 3 nerves on each side) 9/30/22 Dr Aguirre ([place])- 2021   Previous Diagnostic Studies/Images:  MRI CS 4/23  Cervical lordosis maintained. Cervical vertebral body heights are maintained. Mild C3-C4 anterolisthesis.. Cervical vertebral body bone marrow signal within normal limits. There are multilevel degenerative endplate changes with osteophyte formation, intervertebral disc space narrowing/desiccation, Schmorl's nodes and endplate irregularity. . No suspicious expansile or destructive osseous lesion identified. Paraspinal soft tissues are within normal limits. Visualized lung apices are unremarkable. Visualized vertebral artery flow voids are preserved. No abnormal cord signal identified. No significant periarticular or paraspinal soft tissue edema identified.   There are multilevel findings as follows:     Craniocervical junction unremarkable.   C2-C3: No significant canal or foraminal stenosis.   C3-C4: Posterior osteophytic ridging with disc bulging and bilateral uncovertebral/facet hypertrophy contributing to no significant canal stenosis and mild bilateral foraminal stenosis.   C4-C5: Posterior osteophytic ridging with disc bulging and bilateral uncovertebral/facet hypertrophy contributing to no significant canal stenosis and moderate bilateral foraminal stenosis.    C5-C6: Posterior osteophytic ridging with disc bulging and bilateral uncovertebral/facet hypertrophy contributing to no significant canal stenosis and moderate to severe bilateral foraminal stenosis.    C6-C7: Posterior osteophytic ridging with disc bulging and bilateral uncovertebral/facet hypertrophy contributing to no significant canal stenosis and moderate bilateral foraminal stenosis.   C7-T1: No significant canal or foraminal stenosis.     IMPRESSION:   Multilevel cervical spondylitic changes as detailed above most notable for moderate to severe bilateral foraminal stenosis at C4-C5, C5-C6 and C6-C7. No significant canal stenosis of the cervical spine.   MRI  LS 8/22  There is grade 1 anterolisthesis of L5-S1. There is straightening of the normal lumbar lordosis. There is S-shaped curvature of the lumbar spine. The vertebral body heights are maintained. The vertebral body marrow signal is appropriate for the patient's stated age.   The conus medullaris ends at L1.   L1/2 level: Small broad-based posterior disc bulge and mild facet arthropathy without central canal and neural foraminal narrowing.   L2/3  level: Broad-based posterior disc bulge and moderate bilateral facet arthropathy result in mild bilateral neural foraminal narrowing and moderate central canal narrowing. There is impingement of the bilateral descending [id number] roots within the right and left lateral recesses.   L3/4 level: Broad-based posterior disc bulge and moderate bilateral facet arthropathy result in severe central canal narrowing, mild left neural foraminal narrowing and moderate right neural charan inal narrowing. There is impingement of the descending right L4 nerve root within the right lateral recess.   L4/5 level: Broad-based posterior disc bulge and moderate bilateral facet arthropathy (right greater than left) result in mild central canal narrowing, moderate to severe right neural foraminal narrowing and moderate left neural foraminal narrowing. There is impingement of the descending right L5 nerve roots within the lateral recess.   L5/S1 level: Grade 1 anterolisthesis. Broad-based posterior disc bulge and severe bilateral facet arthropathy (right greater than left) result in moderate central canal narrowing, moderate right neural foraminal narrowing and mild left neural foraminal narrowing. There is impingement of the bilateral descending S1 nerve roots within the right and left lateral recesses.     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 There is also moderate central canal narrowing at L5-S1 in addition to moderate right neural foraminal narrowing and mild left neural foraminal narrowing. Additional varying degrees of central canal and neural foraminal narrowing, as above.         [FreeTextEntry2] : 3

## 2024-09-24 NOTE — PHYSICAL EXAM
[Normal] : Well developed, in no acute distress, alert and oriented to person, place and time [Normal muscle bulk without asymmetry] : normal muscle bulk without asymmetry [Facet Tenderness] : no facet tenderness [Cane] : ambulates with cane [] : Motor:

## 2024-09-24 NOTE — ASSESSMENT
[FreeTextEntry1] : >> Imaging and Other Studies  I personally reviewed the relevant imaging. Discussed and explained to patient the likely source of pathology and pain. Questions answered. MRI  may consider PT vs intervention pending eval   >> Therapy and Other Modalities start PT   start PT - referrla provided TENS trial  >> Medications  trial gabapentin uptitrate to TID cautioned change in mood.  Encouraged to call with any worsening mood or depression/suicidal ideations  acetaminophen 650mg q8h prn pain (caution <3g daily)  trial voltaren prn pain  >> Interventions  Significant component of back and leg pain likely secondary to lumbar spinal stenosis demonstrated on MRI LS. sp L4-5 interlaminar epidural steroid injection with significant improvement  given efficacy of previous intervention that is >80% improvement in pain and improved ability to perform adls, and return of pain despite conservative treatment, sp L4-5 interlaminar epidural steroid injection with significant improvement in back and buttock pain   Significant component of axial back pain secondary to lumbar spondylosis and facet arthropathy demonstrated on MRI LS. Pain refractory to conservative treatments. sp BILATERAL L4-sacral ala diagnostic medial branch block (2 joints, 3 nerves on each side) with 80% sustained relief of axial back pain  sp repeat BILATERAL L4-sacral ala diagnostic medial branch block (2 joints, 3 nerves on each side) with 100% sustained improvement  Given significant relief from diagnostic lumbar medial branch block of >80%, and significant improvement in function (as measured by MITCHELL) and continued lumbar facet arthropathy pain (demonstrated on imaging) and axial back pain, sp LEFT AND RIGHT L4-sacral ala medial branch (2 joints, 3 nerves on each side) radiofrequency ablation with significant improvement  may consider repeat RFA  >> Consults  na  >> Discussion of Risks/Benefits/Alternatives   >Regarding any scheduled procedures:  I have discussed in detail with the patient that any interventional pain procedure is associated with potential risks. The procedure may include an injection of steroids and potentially other medications (local anesthetic and normal saline) into the epidural space or surrounding tissue of the spine. There are significant risks of this procedure which include and are not limited to infection, bleeding, worsening pain, dural puncture leading to postdural puncture headache, nerve damage, spinal cord injury, paralysis, stroke, and death.  There is a chance that the procedure does not improve their pain.  There are risks associated with the steroid being absorbed into the body systemically. These include dysphoria, difficulty sleeping, mood swings and personality changes. Premenopausal women may notice an irregularity in her menstrual cycle for 2-3 months following the injection. Steroids can specifically affect patients with hypertension, diabetes, and peptic ulcers. The procedure may cause a temporary increase in blood pressure and blood pressure, and may adversely affect a peptic ulcer. Other, more rare complications, include avascular necrosis of joints, glaucoma and worsening of osteoporosis.  I have discussed the risks of the procedure at length with the patient, and the potential benefits of pain relief. I have offered alternatives to the procedure. All questions were answered.  The patient expressed understanding and wishes to proceed with the procedure.   > Longitudinal management of Complex Painful condition   The patient is being managed for a complex condition that requires ongoing management.  The nature of this condition demands nuanced approach to treatment.  The seriousness of the condition necessitates an in-depth and focused approach to management and coordination with other healthcare professionals.    This visit involves intricate evaluation and management of the patient's condition.  The complexity of the visit was due to the need for detailed assessment of the current state, consideration of potential complications and a careful balancing of treatment options to management the chronic condition effectively.   As detailed above, the patient has a chronic significant painful condition that requires regular and detailed management.  The condition's impact on the patient's quality of life and health is substantial and necessitates a comprehensive and tailored approach   >> Conclusion   There were no barriers to communication. Informed patient that I would be available for any additional questions. Patient was instructed to call with any worsening symptoms including severe pain, new numbness/weakness, or changes in the bowel/bladder function. Discussed role of nsaids in pain management and all relevant risks, if patient is continuing to require after 4 weeks the patient should f/u for alternative treatment. Instructed patient to maintain pain diary to monitor pain level, mobility, and function.

## 2024-10-25 ENCOUNTER — RX RENEWAL (OUTPATIENT)
Age: 89
End: 2024-10-25

## 2024-11-15 ENCOUNTER — APPOINTMENT (OUTPATIENT)
Dept: PAIN MANAGEMENT | Facility: CLINIC | Age: 89
End: 2024-11-15

## 2024-11-15 VITALS
DIASTOLIC BLOOD PRESSURE: 70 MMHG | SYSTOLIC BLOOD PRESSURE: 130 MMHG | WEIGHT: 133 LBS | HEIGHT: 59 IN | BODY MASS INDEX: 26.81 KG/M2

## 2024-11-15 DIAGNOSIS — M54.16 RADICULOPATHY, LUMBAR REGION: ICD-10-CM

## 2024-11-15 DIAGNOSIS — M79.2 NEURALGIA AND NEURITIS, UNSPECIFIED: ICD-10-CM

## 2024-11-15 DIAGNOSIS — G89.4 CHRONIC PAIN SYNDROME: ICD-10-CM

## 2024-11-15 DIAGNOSIS — M48.061 SPINAL STENOSIS, LUMBAR REGION WITHOUT NEUROGENIC CLAUDICATION: ICD-10-CM

## 2024-11-15 DIAGNOSIS — M47.817 SPONDYLOSIS W/OUT MYELOPATHY OR RADICULOPATHY, LUMBOSACRAL REGION: ICD-10-CM

## 2024-11-15 DIAGNOSIS — M48.02 SPINAL STENOSIS, CERVICAL REGION: ICD-10-CM

## 2024-11-15 PROCEDURE — G2211 COMPLEX E/M VISIT ADD ON: CPT

## 2024-11-15 PROCEDURE — 99214 OFFICE O/P EST MOD 30 MIN: CPT

## 2025-01-02 ENCOUNTER — APPOINTMENT (OUTPATIENT)
Dept: GERIATRICS | Facility: CLINIC | Age: 89
End: 2025-01-02
Payer: MEDICARE

## 2025-01-02 VITALS
HEART RATE: 64 BPM | OXYGEN SATURATION: 97 % | HEIGHT: 59 IN | TEMPERATURE: 97.6 F | SYSTOLIC BLOOD PRESSURE: 128 MMHG | WEIGHT: 128 LBS | DIASTOLIC BLOOD PRESSURE: 64 MMHG | BODY MASS INDEX: 25.8 KG/M2

## 2025-01-02 DIAGNOSIS — K21.9 GASTRO-ESOPHAGEAL REFLUX DISEASE W/OUT ESOPHAGITIS: ICD-10-CM

## 2025-01-02 DIAGNOSIS — R05.3 CHRONIC COUGH: ICD-10-CM

## 2025-01-02 DIAGNOSIS — M81.0 AGE-RELATED OSTEOPOROSIS W/OUT CURRENT PATHOLOGICAL FRACTURE: ICD-10-CM

## 2025-01-02 DIAGNOSIS — R35.0 FREQUENCY OF MICTURITION: ICD-10-CM

## 2025-01-02 PROCEDURE — 99214 OFFICE O/P EST MOD 30 MIN: CPT

## 2025-01-02 PROCEDURE — 36415 COLL VENOUS BLD VENIPUNCTURE: CPT

## 2025-01-02 PROCEDURE — G2211 COMPLEX E/M VISIT ADD ON: CPT

## 2025-01-03 LAB
25(OH)D3 SERPL-MCNC: 49.9 NG/ML
ALBUMIN SERPL ELPH-MCNC: 4.7 G/DL
ALP BLD-CCNC: 51 U/L
ALT SERPL-CCNC: 17 U/L
ANION GAP SERPL CALC-SCNC: 12 MMOL/L
AST SERPL-CCNC: 21 U/L
BASOPHILS # BLD AUTO: 0.03 K/UL
BASOPHILS NFR BLD AUTO: 0.5 %
BILIRUB SERPL-MCNC: 0.2 MG/DL
BUN SERPL-MCNC: 25 MG/DL
CALCIUM SERPL-MCNC: 10 MG/DL
CHLORIDE SERPL-SCNC: 108 MMOL/L
CHOLEST SERPL-MCNC: 164 MG/DL
CO2 SERPL-SCNC: 24 MMOL/L
CREAT SERPL-MCNC: 1.07 MG/DL
EGFR: 49 ML/MIN/1.73M2
EOSINOPHIL # BLD AUTO: 0.09 K/UL
EOSINOPHIL NFR BLD AUTO: 1.4 %
GLUCOSE SERPL-MCNC: 93 MG/DL
HCT VFR BLD CALC: 41.8 %
HDLC SERPL-MCNC: 43 MG/DL
HGB BLD-MCNC: 12.9 G/DL
IMM GRANULOCYTES NFR BLD AUTO: 0.2 %
LDLC SERPL CALC-MCNC: 81 MG/DL
LYMPHOCYTES # BLD AUTO: 2.2 K/UL
LYMPHOCYTES NFR BLD AUTO: 35.1 %
MAN DIFF?: NORMAL
MCHC RBC-ENTMCNC: 29.8 PG
MCHC RBC-ENTMCNC: 30.9 G/DL
MCV RBC AUTO: 96.5 FL
MONOCYTES # BLD AUTO: 0.7 K/UL
MONOCYTES NFR BLD AUTO: 11.2 %
NEUTROPHILS # BLD AUTO: 3.23 K/UL
NEUTROPHILS NFR BLD AUTO: 51.6 %
NONHDLC SERPL-MCNC: 121 MG/DL
PLATELET # BLD AUTO: 174 K/UL
POTASSIUM SERPL-SCNC: 4.7 MMOL/L
PROT SERPL-MCNC: 7.3 G/DL
RBC # BLD: 4.33 M/UL
RBC # FLD: 13.3 %
SODIUM SERPL-SCNC: 144 MMOL/L
TRIGL SERPL-MCNC: 241 MG/DL
TSH SERPL-ACNC: 2.21 UIU/ML
VIT B12 SERPL-MCNC: 1143 PG/ML
WBC # FLD AUTO: 6.26 K/UL

## 2025-01-07 ENCOUNTER — RX RENEWAL (OUTPATIENT)
Age: 89
End: 2025-01-07

## 2025-01-17 ENCOUNTER — RX RENEWAL (OUTPATIENT)
Age: 89
End: 2025-01-17

## 2025-02-14 ENCOUNTER — APPOINTMENT (OUTPATIENT)
Dept: PAIN MANAGEMENT | Facility: CLINIC | Age: 89
End: 2025-02-14

## 2025-02-14 VITALS
OXYGEN SATURATION: 100 % | SYSTOLIC BLOOD PRESSURE: 147 MMHG | DIASTOLIC BLOOD PRESSURE: 71 MMHG | BODY MASS INDEX: 25.8 KG/M2 | HEART RATE: 60 BPM | HEIGHT: 59 IN | WEIGHT: 128 LBS

## 2025-02-14 DIAGNOSIS — M48.02 SPINAL STENOSIS, CERVICAL REGION: ICD-10-CM

## 2025-02-14 DIAGNOSIS — M48.061 SPINAL STENOSIS, LUMBAR REGION WITHOUT NEUROGENIC CLAUDICATION: ICD-10-CM

## 2025-02-14 DIAGNOSIS — G89.4 CHRONIC PAIN SYNDROME: ICD-10-CM

## 2025-02-14 DIAGNOSIS — M47.817 SPONDYLOSIS W/OUT MYELOPATHY OR RADICULOPATHY, LUMBOSACRAL REGION: ICD-10-CM

## 2025-02-14 DIAGNOSIS — M79.2 NEURALGIA AND NEURITIS, UNSPECIFIED: ICD-10-CM

## 2025-02-14 DIAGNOSIS — M54.16 RADICULOPATHY, LUMBAR REGION: ICD-10-CM

## 2025-02-14 PROCEDURE — 99214 OFFICE O/P EST MOD 30 MIN: CPT

## 2025-02-14 PROCEDURE — G2211 COMPLEX E/M VISIT ADD ON: CPT

## 2025-02-14 RX ADMIN — DICLOFENAC SODIUM 0 %: 20 SOLUTION TOPICAL at 00:00

## 2025-02-21 ENCOUNTER — RX RENEWAL (OUTPATIENT)
Age: 89
End: 2025-02-21

## 2025-03-04 ENCOUNTER — APPOINTMENT (OUTPATIENT)
Dept: RHEUMATOLOGY | Facility: CLINIC | Age: 89
End: 2025-03-04
Payer: MEDICARE

## 2025-03-04 VITALS
BODY MASS INDEX: 26.61 KG/M2 | DIASTOLIC BLOOD PRESSURE: 70 MMHG | OXYGEN SATURATION: 95 % | HEIGHT: 59 IN | SYSTOLIC BLOOD PRESSURE: 120 MMHG | WEIGHT: 132 LBS | HEART RATE: 68 BPM

## 2025-03-04 DIAGNOSIS — M81.0 AGE-RELATED OSTEOPOROSIS W/OUT CURRENT PATHOLOGICAL FRACTURE: ICD-10-CM

## 2025-03-04 DIAGNOSIS — M06.9 RHEUMATOID ARTHRITIS, UNSPECIFIED: ICD-10-CM

## 2025-03-04 PROCEDURE — 99214 OFFICE O/P EST MOD 30 MIN: CPT

## 2025-03-04 RX ORDER — DENOSUMAB 60 MG/ML
60 INJECTION SUBCUTANEOUS
Qty: 1 | Refills: 0 | Status: COMPLETED | OUTPATIENT
Start: 2025-03-04

## 2025-03-14 ENCOUNTER — APPOINTMENT (OUTPATIENT)
Dept: PAIN MANAGEMENT | Facility: CLINIC | Age: 89
End: 2025-03-14
Payer: MEDICARE

## 2025-03-14 VITALS
OXYGEN SATURATION: 98 % | HEART RATE: 60 BPM | WEIGHT: 134 LBS | HEIGHT: 59 IN | DIASTOLIC BLOOD PRESSURE: 84 MMHG | SYSTOLIC BLOOD PRESSURE: 142 MMHG | BODY MASS INDEX: 27.01 KG/M2

## 2025-03-14 DIAGNOSIS — M54.16 RADICULOPATHY, LUMBAR REGION: ICD-10-CM

## 2025-03-14 DIAGNOSIS — M48.061 SPINAL STENOSIS, LUMBAR REGION WITHOUT NEUROGENIC CLAUDICATION: ICD-10-CM

## 2025-03-14 PROCEDURE — 62323 NJX INTERLAMINAR LMBR/SAC: CPT

## 2025-03-14 RX ADMIN — TRIAMCINOLONE ACETONIDE 0 MG/ML: 80 INJECTION, SUSPENSION INTRA-ARTICULAR; INTRAMUSCULAR at 00:00

## 2025-03-14 RX ADMIN — LIDOCAINE HYDROCHLORIDE %: 10 INJECTION, SOLUTION INFILTRATION; PERINEURAL at 00:00

## 2025-03-14 RX ADMIN — IOHEXOL 0 MG/ML: 180 INJECTION INTRAVENOUS at 00:00

## 2025-03-28 ENCOUNTER — APPOINTMENT (OUTPATIENT)
Dept: PAIN MANAGEMENT | Facility: CLINIC | Age: 89
End: 2025-03-28

## 2025-03-28 VITALS
SYSTOLIC BLOOD PRESSURE: 104 MMHG | BODY MASS INDEX: 27.01 KG/M2 | WEIGHT: 134 LBS | HEIGHT: 59 IN | DIASTOLIC BLOOD PRESSURE: 61 MMHG

## 2025-03-28 DIAGNOSIS — M79.2 NEURALGIA AND NEURITIS, UNSPECIFIED: ICD-10-CM

## 2025-03-28 DIAGNOSIS — G89.4 CHRONIC PAIN SYNDROME: ICD-10-CM

## 2025-03-28 DIAGNOSIS — M47.817 SPONDYLOSIS W/OUT MYELOPATHY OR RADICULOPATHY, LUMBOSACRAL REGION: ICD-10-CM

## 2025-03-28 DIAGNOSIS — M54.16 RADICULOPATHY, LUMBAR REGION: ICD-10-CM

## 2025-03-28 DIAGNOSIS — M48.061 SPINAL STENOSIS, LUMBAR REGION WITHOUT NEUROGENIC CLAUDICATION: ICD-10-CM

## 2025-03-28 DIAGNOSIS — M48.02 SPINAL STENOSIS, CERVICAL REGION: ICD-10-CM

## 2025-03-28 PROCEDURE — G2211 COMPLEX E/M VISIT ADD ON: CPT

## 2025-03-28 PROCEDURE — 99214 OFFICE O/P EST MOD 30 MIN: CPT

## 2025-04-24 ENCOUNTER — RESULT REVIEW (OUTPATIENT)
Age: 89
End: 2025-04-24

## 2025-05-01 ENCOUNTER — RX RENEWAL (OUTPATIENT)
Age: 89
End: 2025-05-01

## 2025-05-05 ENCOUNTER — APPOINTMENT (OUTPATIENT)
Dept: GERIATRICS | Facility: CLINIC | Age: 89
End: 2025-05-05
Payer: MEDICARE

## 2025-05-05 VITALS
WEIGHT: 132 LBS | BODY MASS INDEX: 26.61 KG/M2 | HEIGHT: 59 IN | DIASTOLIC BLOOD PRESSURE: 70 MMHG | SYSTOLIC BLOOD PRESSURE: 122 MMHG | OXYGEN SATURATION: 95 % | HEART RATE: 56 BPM

## 2025-05-05 DIAGNOSIS — E78.2 MIXED HYPERLIPIDEMIA: ICD-10-CM

## 2025-05-05 DIAGNOSIS — I10 ESSENTIAL (PRIMARY) HYPERTENSION: ICD-10-CM

## 2025-05-05 DIAGNOSIS — R35.0 FREQUENCY OF MICTURITION: ICD-10-CM

## 2025-05-05 DIAGNOSIS — M81.0 AGE-RELATED OSTEOPOROSIS W/OUT CURRENT PATHOLOGICAL FRACTURE: ICD-10-CM

## 2025-05-05 PROCEDURE — 99214 OFFICE O/P EST MOD 30 MIN: CPT | Mod: 25

## 2025-05-05 PROCEDURE — 36415 COLL VENOUS BLD VENIPUNCTURE: CPT

## 2025-05-05 PROCEDURE — G0439: CPT

## 2025-05-05 PROCEDURE — G2211 COMPLEX E/M VISIT ADD ON: CPT

## 2025-05-05 RX ORDER — MAGNESIUM OXIDE 400 MG
400 CAPSULE ORAL EVERY OTHER DAY
Refills: 0 | Status: ACTIVE | COMMUNITY
Start: 2025-05-05

## 2025-05-08 PROBLEM — E78.2 MIXED HYPERLIPIDEMIA: Status: ACTIVE | Noted: 2025-05-08

## 2025-05-08 LAB
ALBUMIN SERPL ELPH-MCNC: 4.8 G/DL
ALP BLD-CCNC: 39 U/L
ALT SERPL-CCNC: 17 U/L
ANION GAP SERPL CALC-SCNC: 14 MMOL/L
AST SERPL-CCNC: 23 U/L
BILIRUB SERPL-MCNC: 0.6 MG/DL
BUN SERPL-MCNC: 22 MG/DL
CALCIUM SERPL-MCNC: 10.7 MG/DL
CHLORIDE SERPL-SCNC: 107 MMOL/L
CO2 SERPL-SCNC: 23 MMOL/L
CREAT SERPL-MCNC: 1.07 MG/DL
EGFRCR SERPLBLD CKD-EPI 2021: 49 ML/MIN/1.73M2
GLUCOSE SERPL-MCNC: 96 MG/DL
MAGNESIUM SERPL-MCNC: 1.9 MG/DL
POTASSIUM SERPL-SCNC: 4.7 MMOL/L
PROT SERPL-MCNC: 6.9 G/DL
SODIUM SERPL-SCNC: 144 MMOL/L
TSH SERPL-ACNC: 2.84 UIU/ML

## 2025-06-05 ENCOUNTER — RX RENEWAL (OUTPATIENT)
Age: 89
End: 2025-06-05

## 2025-07-16 ENCOUNTER — NON-APPOINTMENT (OUTPATIENT)
Age: 89
End: 2025-07-16

## 2025-09-16 ENCOUNTER — APPOINTMENT (OUTPATIENT)
Dept: RHEUMATOLOGY | Facility: CLINIC | Age: 89
End: 2025-09-16